# Patient Record
Sex: MALE | Race: WHITE | NOT HISPANIC OR LATINO | URBAN - METROPOLITAN AREA
[De-identification: names, ages, dates, MRNs, and addresses within clinical notes are randomized per-mention and may not be internally consistent; named-entity substitution may affect disease eponyms.]

---

## 2017-02-17 ENCOUNTER — DOCTOR'S OFFICE (OUTPATIENT)
Dept: URBAN - METROPOLITAN AREA CLINIC 136 | Facility: CLINIC | Age: 67
Setting detail: OPHTHALMOLOGY
End: 2017-02-17
Payer: COMMERCIAL

## 2017-02-17 DIAGNOSIS — H35.3134: ICD-10-CM

## 2017-02-17 DIAGNOSIS — H27.8: ICD-10-CM

## 2017-02-17 DIAGNOSIS — H18.50: ICD-10-CM

## 2017-02-17 DIAGNOSIS — H26.493: ICD-10-CM

## 2017-02-17 DIAGNOSIS — H43.813: ICD-10-CM

## 2017-02-17 PROCEDURE — 92134 CPTRZ OPH DX IMG PST SGM RTA: CPT | Performed by: OPHTHALMOLOGY

## 2017-02-17 PROCEDURE — 92014 COMPRE OPH EXAM EST PT 1/>: CPT | Performed by: OPHTHALMOLOGY

## 2017-02-17 ASSESSMENT — VISUAL ACUITY
OD_BCVA: 20/20-2
OS_BCVA: 20/25+1

## 2017-02-17 ASSESSMENT — REFRACTION_MANIFEST
OD_VA2: 20/
OD_VA3: 20/
OS_VA1: 20/30-2
OU_VA: 20/
OD_SPHERE: +0.25
OU_VA: 20/
OS_VA3: 20/
OD_VA1: 20/30
OS_VA1: 20/
OD_AXIS: 075
OS_ADD: +2.50
OD_VA2: 20/
OD_ADD: +2.50
OD_VA1: 20/
OS_VA2: 20/
OS_VA2: 20/
OD_CYLINDER: -1.00
OS_VA2: 20/30-2
OS_VA3: 20/
OD_VA2: 20/30
OS_SPHERE: +0.50
OS_VA1: 20/
OU_VA: 20/
OD_VA1: 20/
OD_VA3: 20/
OS_VA3: 20/
OS_AXIS: 105
OS_CYLINDER: -1.25
OD_VA3: 20/

## 2017-02-17 ASSESSMENT — SPHEQUIV_DERIVED
OD_SPHEQUIV: -0.25
OD_SPHEQUIV: 0.125
OS_SPHEQUIV: 0
OS_SPHEQUIV: -0.125

## 2017-02-17 ASSESSMENT — REFRACTION_CURRENTRX
OD_OVR_VA: 20/
OD_OVR_VA: 20/
OS_OVR_VA: 20/
OD_OVR_VA: 20/
OS_OVR_VA: 20/
OS_OVR_VA: 20/

## 2017-02-17 ASSESSMENT — CONFRONTATIONAL VISUAL FIELD TEST (CVF)
OS_FINDINGS: FULL
OD_FINDINGS: FULL

## 2017-02-17 ASSESSMENT — REFRACTION_AUTOREFRACTION
OD_CYLINDER: -1.25
OS_SPHERE: +0.75
OD_SPHERE: +0.75
OD_AXIS: 74
OS_AXIS: 106
OS_CYLINDER: -1.50

## 2017-02-17 ASSESSMENT — CORNEAL DYSTROPHY: OS_DYSTROPHY: S DRY EYES

## 2017-03-10 ENCOUNTER — DOCTOR'S OFFICE (OUTPATIENT)
Dept: URBAN - METROPOLITAN AREA CLINIC 136 | Facility: CLINIC | Age: 67
Setting detail: OPHTHALMOLOGY
End: 2017-03-10

## 2017-03-10 DIAGNOSIS — H52.223: ICD-10-CM

## 2017-03-10 DIAGNOSIS — H52.03: ICD-10-CM

## 2017-03-10 DIAGNOSIS — H52.4: ICD-10-CM

## 2017-03-10 PROCEDURE — 92015 DETERMINE REFRACTIVE STATE: CPT | Performed by: OPTOMETRIST

## 2017-03-10 ASSESSMENT — KERATOMETRY
OD_AXISANGLE_DEGREES: 178
OS_K1POWER_DIOPTERS: 42.50
OS_K2POWER_DIOPTERS: 44.00
OS_AXISANGLE_DEGREES: 017
OD_K2POWER_DIOPTERS: 44.00
OD_K1POWER_DIOPTERS: 43.00

## 2017-03-10 ASSESSMENT — SPHEQUIV_DERIVED
OD_SPHEQUIV: 0
OS_SPHEQUIV: 0.125

## 2017-03-10 ASSESSMENT — REFRACTION_CURRENTRX
OD_OVR_VA: 20/
OS_CYLINDER: -1.25
OS_VPRISM_DIRECTION: PROGS
OD_AXIS: 075
OS_OVR_VA: 20/
OS_AXIS: 105
OS_OVR_VA: 20/
OS_OVR_VA: 20/
OD_OVR_VA: 20/
OD_OVR_VA: 20/
OS_ADD: +2.50
OD_VPRISM_DIRECTION: PROGS
OD_SPHERE: +0.25
OS_SPHERE: +0.50
OD_CYLINDER: -1.00
OD_ADD: +2.50

## 2017-03-10 ASSESSMENT — REFRACTION_AUTOREFRACTION
OD_SPHERE: +0.50
OD_AXIS: 067
OS_SPHERE: +0.75
OS_CYLINDER: -1.25
OD_CYLINDER: -1.00
OS_AXIS: 097

## 2017-03-10 ASSESSMENT — REFRACTION_MANIFEST
OU_VA: 20/
OS_VA2: 20/
OD_VA1: 20/
OD_VA2: 20/
OD_VA1: 20/
OS_VA3: 20/
OS_VA2: 20/
OS_VA1: 20/
OD_VA3: 20/
OD_VA3: 20/
OS_VA3: 20/
OU_VA: 20/
OS_VA1: 20/
OD_VA2: 20/

## 2017-03-10 ASSESSMENT — REFRACTION_OUTSIDERX
OD_SPHERE: +0.50
OS_AXIS: 105
OD_ADD: +2.50
OS_SPHERE: +0.75
OS_VA2: 20/25
OD_AXIS: 075
OS_ADD: +2.50
OD_VA3: 20/
OS_VA1: 20/25
OD_VA2: 20/25
OS_CYLINDER: -1.25
OS_VA3: 20/
OU_VA: 20/20-2
OD_VA1: 20/25-1
OD_CYLINDER: -1.00

## 2017-03-10 ASSESSMENT — AXIALLENGTH_DERIVED
OS_AL: 23.6351
OD_AL: 23.5919

## 2017-03-10 ASSESSMENT — VISUAL ACUITY
OS_BCVA: 20/25-2
OD_BCVA: 20/25-2

## 2017-08-29 ENCOUNTER — DOCTOR'S OFFICE (OUTPATIENT)
Dept: URBAN - METROPOLITAN AREA CLINIC 136 | Facility: CLINIC | Age: 67
Setting detail: OPHTHALMOLOGY
End: 2017-08-29
Payer: COMMERCIAL

## 2017-08-29 DIAGNOSIS — H43.813: ICD-10-CM

## 2017-08-29 DIAGNOSIS — H26.493: ICD-10-CM

## 2017-08-29 DIAGNOSIS — H27.8: ICD-10-CM

## 2017-08-29 DIAGNOSIS — H35.3133: ICD-10-CM

## 2017-08-29 PROCEDURE — 92134 CPTRZ OPH DX IMG PST SGM RTA: CPT | Performed by: OPHTHALMOLOGY

## 2017-08-29 PROCEDURE — 92014 COMPRE OPH EXAM EST PT 1/>: CPT | Performed by: OPHTHALMOLOGY

## 2017-08-29 ASSESSMENT — REFRACTION_MANIFEST
OD_VA1: 20/
OD_VA2: 20/
OS_VA2: 20/
OS_VA3: 20/
OU_VA: 20/
OS_VA1: 20/
OD_VA2: 20/
OS_VA1: 20/
OD_VA3: 20/
OD_VA3: 20/
OS_VA3: 20/
OU_VA: 20/
OD_VA1: 20/
OS_VA2: 20/

## 2017-08-29 ASSESSMENT — REFRACTION_CURRENTRX
OS_ADD: +2.50
OD_OVR_VA: 20/
OS_OVR_VA: 20/
OS_OVR_VA: 20/
OS_VPRISM_DIRECTION: PROGS
OS_OVR_VA: 20/
OD_VPRISM_DIRECTION: PROGS
OD_AXIS: 075
OS_SPHERE: +0.50
OD_CYLINDER: -1.00
OD_OVR_VA: 20/
OS_AXIS: 105
OS_CYLINDER: -1.25
OD_SPHERE: +0.25
OD_ADD: +2.50
OD_OVR_VA: 20/

## 2017-08-29 ASSESSMENT — REFRACTION_AUTOREFRACTION
OS_SPHERE: +0.75
OD_AXIS: 067
OD_SPHERE: +0.50
OD_CYLINDER: -1.00
OS_AXIS: 097
OS_CYLINDER: -1.25

## 2017-08-29 ASSESSMENT — REFRACTION_OUTSIDERX
OS_SPHERE: +0.75
OD_ADD: +2.50
OS_VA3: 20/
OS_CYLINDER: -1.25
OD_VA2: 20/25
OS_VA1: 20/25
OD_AXIS: 075
OS_AXIS: 105
OS_VA2: 20/25
OD_VA3: 20/
OU_VA: 20/20-2
OD_CYLINDER: -1.00
OS_ADD: +2.50
OD_SPHERE: +0.50
OD_VA1: 20/25-1

## 2017-08-29 ASSESSMENT — CONFRONTATIONAL VISUAL FIELD TEST (CVF)
OD_FINDINGS: FULL
OS_FINDINGS: FULL

## 2017-08-29 ASSESSMENT — SPHEQUIV_DERIVED
OD_SPHEQUIV: 0
OS_SPHEQUIV: 0.125

## 2017-08-29 ASSESSMENT — CORNEAL DYSTROPHY: OS_DYSTROPHY: S DRY EYES

## 2017-08-29 ASSESSMENT — VISUAL ACUITY
OS_BCVA: 20/25-1
OD_BCVA: 20/25-1

## 2017-12-28 ENCOUNTER — OFFICE VISIT (OUTPATIENT)
Dept: URGENT CARE | Facility: CLINIC | Age: 67
End: 2017-12-28
Payer: MEDICARE

## 2017-12-28 PROCEDURE — G0463 HOSPITAL OUTPT CLINIC VISIT: HCPCS

## 2017-12-28 PROCEDURE — 99203 OFFICE O/P NEW LOW 30 MIN: CPT

## 2018-01-01 NOTE — PROGRESS NOTES
Assessment   1  Acute sinusitis (461 9) (J01 90)    Plan   Acute sinusitis    · Doxycycline Hyclate 100 MG Oral Capsule; TAKE 1 CAPSULE EVERY 12 HOURS    DAILY    Discussion/Summary   Discussion Summary:    Take antibiotic as prescribed nasal saline rinses meds as needed for symptom relief up with family doctor if symptoms worsen or persist       Chief Complaint   1  Cough  Chief Complaint Free Text Note Form: Patient relates started with sinus symptoms and sore throat x1 week  Felt hot  Has been taking Tylenol at home  History of Present Illness   HPI: A 64-year-old male presents with nasal congestion, sore throat, productive cough x1 week and fever x1 day  T-max 102Â°  Patient denies nausea vomiting diarrhea shortness of breath wheezing ear pain  Hospital Based Practices Required Assessment:      Abuse And Domestic Violence Screen       Yes, the patient is safe at home  -- The patient states no one is hurting them  Depression And Suicide Screen  No, the patient has not had thoughts of hurting themself  No, the patient has not felt depressed in the past 7 days  Prefered Language is  english  Primary Language is  english  Review of Systems   Focused-Male:      Constitutional: fever,-- feeling poorly-- and-- feeling tired, but-- no fever or chills, feels well, no tiredness, no recent weight loss or weight gain-- and-- no chills  ENT: earache,-- sore throat-- and-- nasal discharge, but-- no complaints of earache, no loss of hearing, no nosebleeds or nasal discharge, no sore throat or hoarseness,-- no nosebleeds-- and-- no hearing loss  Respiratory: cough, but-- no complaints of shortness of breath, no wheezing or cough, no dyspnea on exertion, no orthopnea or PND,-- no shortness of breath,-- no orthopnea,-- no wheezing,-- no shortness of breath during exertion-- and-- no PND        Gastrointestinal: no complaints of abdominal pain, no constipation, no nausea or vomiting, no diarrhea or bloody stools  Genitourinary: no complaints of dysuria or incontinence, no hesitancy, no nocturia, no genital lesion, no inadequacy of penile erection  Musculoskeletal: no complaints of arthralgia, no myalgia, no joint swelling or stiffness, no limb pain or swelling  Integumentary: no complaints of skin rash or lesion, no itching or dry skin, no skin wounds  Neurological: no complaints of headache, no confusion, no numbness or tingling, no dizziness or fainting  ROS Reviewed:    ROS reviewed  Active Problems   1  Acute sinusitis (461 9) (J01 90)    Past Medical History   1  History of Russ's esophagus (V12 79) (Z87 19)   2  History of hypertension (V12 59) (Z86 79)   3  History of Venous stasis (459 81) (I87 8)  Active Problems And Past Medical History Reviewed: The active problems and past medical history were reviewed and updated today  Family History   Mother    1  Family history of cardiac disorder (V17 49) (Z82 49)  Family History Reviewed: The family history was reviewed and updated today  Social History    · Never a smoker  Social History Reviewed: The social history was reviewed and is unchanged  Surgical History   1  History of Hip Replacement Left   2  History of Hip Replacement Right  Surgical History Reviewed: The surgical history was reviewed and updated today  Current Meds    1  Diovan 40 MG Oral Tablet; Therapy: (Recorded:[id number]) to Recorded   2  Furosemide 20 MG Oral Tablet; Therapy: (Recorded:16Lll1831) to Recorded   3  NexIUM 40 MG Oral Capsule Delayed Release; Therapy: (Recorded:45Vbd3281) to Recorded  Medication List Reviewed: The medication list was reviewed and updated today  Allergies   1  Penicillins   2  erythromycin   3   Statins    Vitals   Signs   Recorded: [id number] 12:31PM   Temperature: 98 F, Tympanic  Heart Rate: 72  Respiration: 16  Systolic: 996, RUE, Sitting  Diastolic: 90, RUE, Sitting  Height: 5 ft 10 in  Weight: 217 lb   BMI Calculated: 31 14  BSA Calculated: 2 16  O2 Saturation: 97, RA  Pain Scale: 3    Physical Exam        Constitutional      General appearance: No acute distress, well appearing and well nourished  Ears, Nose, Mouth, and Throat      External inspection of ears and nose: Normal        Otoscopic examination: Tympanic membrance translucent with normal light reflex  Canals patent without erythema  Nasal mucosa, septum, and turbinates: Abnormal   normal nasal septum,-- no intranasal masses or polyps-- and-- normal nasal turbinates  There was clear rhinorrhea from both nares  The bilateral nasal mucosa was edematous  Oropharynx: Normal with no erythema, edema, exudate or lesions  Pulmonary      Respiratory effort: No increased work of breathing or signs of respiratory distress  Auscultation of lungs: Clear to auscultation  no rales or crackles were heard bilaterally  no rhonchi  no friction rub  no wheezing  no diminished breath sounds  Cardiovascular      Palpation of heart: Normal PMI, no thrills  Auscultation of heart: Normal rate and rhythm, normal S1 and S2, without murmurs  Abdomen      Abdomen: Non-tender, no masses  Lymphatic      Palpation of lymph nodes in neck: Abnormal   bilateral anterior cervical node enlargement, but-- no posterior cervical node enlargement  Skin      Skin and subcutaneous tissue: Normal without rashes or lesions         Psychiatric      Orientation to person, place and time: Normal        Mood and affect: Normal        Signatures    Electronically signed by : LAYA Mancini; Dec 28 2017  4:12PM EST                       (Author)     Electronically signed by : TEODORO Brown ; Dec 31 2017  2:40PM EST                       (Co-author)

## 2018-01-23 VITALS
HEIGHT: 70 IN | BODY MASS INDEX: 31.07 KG/M2 | DIASTOLIC BLOOD PRESSURE: 90 MMHG | TEMPERATURE: 98 F | OXYGEN SATURATION: 97 % | RESPIRATION RATE: 16 BRPM | WEIGHT: 217 LBS | HEART RATE: 72 BPM | SYSTOLIC BLOOD PRESSURE: 144 MMHG

## 2018-03-14 ENCOUNTER — DOCTOR'S OFFICE (OUTPATIENT)
Dept: URBAN - METROPOLITAN AREA CLINIC 136 | Facility: CLINIC | Age: 68
Setting detail: OPHTHALMOLOGY
End: 2018-03-14

## 2018-03-14 ENCOUNTER — RX ONLY (RX ONLY)
Age: 68
End: 2018-03-14

## 2018-03-14 DIAGNOSIS — H52.4: ICD-10-CM

## 2018-03-14 DIAGNOSIS — H52.03: ICD-10-CM

## 2018-03-14 DIAGNOSIS — H52.223: ICD-10-CM

## 2018-03-14 PROCEDURE — 92015 DETERMINE REFRACTIVE STATE: CPT | Performed by: OPTOMETRIST

## 2018-03-14 ASSESSMENT — REFRACTION_OUTSIDERX
OD_CYLINDER: -1.25
OD_AXIS: 080
OS_CYLINDER: -1.50
OD_ADD: +2.50
OD_VA2: 20/25
OS_AXIS: 105
OS_VA3: 20/
OD_VA1: 20/25-1
OS_VA2: 20/25
OS_ADD: +2.50
OS_SPHERE: +1.00
OS_VA1: 20/25+2
OD_SPHERE: +0.75
OU_VA: 20/20-2
OD_VA3: 20/

## 2018-03-14 ASSESSMENT — REFRACTION_MANIFEST
OD_VA2: 20/
OD_VA1: 20/
OS_VA1: 20/
OS_VA2: 20/
OD_VA3: 20/
OS_VA2: 20/
OD_VA1: 20/
OU_VA: 20/
OS_VA3: 20/
OD_VA3: 20/
OU_VA: 20/
OS_VA3: 20/
OS_VA1: 20/
OD_VA2: 20/

## 2018-03-14 ASSESSMENT — REFRACTION_CURRENTRX
OS_SPHERE: +0.50
OD_ADD: +2.50
OS_OVR_VA: 20/
OS_VPRISM_DIRECTION: PROGS
OD_AXIS: 075
OS_OVR_VA: 20/
OD_CYLINDER: -1.00
OD_OVR_VA: 20/
OS_CYLINDER: -1.25
OD_OVR_VA: 20/
OS_ADD: +2.50
OS_AXIS: 105
OD_OVR_VA: 20/
OS_OVR_VA: 20/
OD_SPHERE: +0.25
OD_VPRISM_DIRECTION: PROGS

## 2018-03-14 ASSESSMENT — SPHEQUIV_DERIVED
OD_SPHEQUIV: -0.25
OS_SPHEQUIV: 0.125

## 2018-03-14 ASSESSMENT — REFRACTION_AUTOREFRACTION
OD_AXIS: 085
OD_SPHERE: +0.25
OD_CYLINDER: -1.00
OS_CYLINDER: -1.75
OS_SPHERE: +1.00
OS_AXIS: 088

## 2018-03-14 ASSESSMENT — CONFRONTATIONAL VISUAL FIELD TEST (CVF)
OD_FINDINGS: FULL
OS_FINDINGS: FULL

## 2018-03-14 ASSESSMENT — AXIALLENGTH_DERIVED
OD_AL: 23.6897
OS_AL: 23.6351

## 2018-03-14 ASSESSMENT — VISUAL ACUITY
OS_BCVA: 20/25-1
OD_BCVA: 20/25-1

## 2018-03-14 ASSESSMENT — KERATOMETRY
OD_AXISANGLE_DEGREES: 178
OD_K1POWER_DIOPTERS: 43.00
OD_K2POWER_DIOPTERS: 44.00
OS_K2POWER_DIOPTERS: 44.00
OS_AXISANGLE_DEGREES: 017
OS_K1POWER_DIOPTERS: 42.50

## 2018-04-11 ENCOUNTER — OPTICAL OFFICE (OUTPATIENT)
Dept: URBAN - METROPOLITAN AREA CLINIC 143 | Facility: CLINIC | Age: 68
Setting detail: OPHTHALMOLOGY
End: 2018-04-11

## 2018-04-11 ENCOUNTER — DOCTOR'S OFFICE (OUTPATIENT)
Dept: URBAN - METROPOLITAN AREA CLINIC 136 | Facility: CLINIC | Age: 68
Setting detail: OPHTHALMOLOGY
End: 2018-04-11
Payer: COMMERCIAL

## 2018-04-11 DIAGNOSIS — H52.223: ICD-10-CM

## 2018-04-11 DIAGNOSIS — H43.813: ICD-10-CM

## 2018-04-11 DIAGNOSIS — H35.3133: ICD-10-CM

## 2018-04-11 DIAGNOSIS — H27.8: ICD-10-CM

## 2018-04-11 PROCEDURE — 92134 CPTRZ OPH DX IMG PST SGM RTA: CPT | Performed by: OPHTHALMOLOGY

## 2018-04-11 PROCEDURE — V2750 ANTI-REFLECTIVE COATING: HCPCS | Performed by: OPTOMETRIST

## 2018-04-11 PROCEDURE — 92014 COMPRE OPH EXAM EST PT 1/>: CPT | Performed by: OPHTHALMOLOGY

## 2018-04-11 PROCEDURE — V2784 LENS POLYCARB OR EQUAL: HCPCS | Performed by: OPTOMETRIST

## 2018-04-11 PROCEDURE — V2781 PROGRESSIVE LENS PER LENS: HCPCS | Performed by: OPTOMETRIST

## 2018-04-11 PROCEDURE — V2020 VISION SVCS FRAMES PURCHASES: HCPCS | Performed by: OPTOMETRIST

## 2018-04-11 ASSESSMENT — REFRACTION_MANIFEST
OD_VA2: 20/
OD_VA2: 20/
OS_VA3: 20/
OS_VA1: 20/
OD_VA1: 20/
OS_VA2: 20/
OS_VA3: 20/
OU_VA: 20/
OS_VA1: 20/
OS_VA2: 20/
OU_VA: 20/
OD_VA1: 20/
OD_VA3: 20/
OD_VA3: 20/

## 2018-04-11 ASSESSMENT — REFRACTION_OUTSIDERX
OD_CYLINDER: -1.25
OD_VA1: 20/25-1
OS_VA2: 20/25
OS_VA1: 20/25+2
OD_VA3: 20/
OS_SPHERE: +1.00
OS_ADD: +2.50
OU_VA: 20/20-2
OS_VA3: 20/
OS_CYLINDER: -1.50
OD_AXIS: 080
OS_AXIS: 105
OD_ADD: +2.50
OD_VA2: 20/25
OD_SPHERE: +0.75

## 2018-04-11 ASSESSMENT — REFRACTION_AUTOREFRACTION
OS_CYLINDER: -1.75
OS_SPHERE: +1.00
OD_AXIS: 085
OD_CYLINDER: -1.00
OS_AXIS: 088
OD_SPHERE: +0.25

## 2018-04-11 ASSESSMENT — REFRACTION_CURRENTRX
OS_OVR_VA: 20/
OS_SPHERE: +0.50
OD_OVR_VA: 20/
OD_VPRISM_DIRECTION: PROGS
OS_CYLINDER: -1.25
OD_AXIS: 075
OS_AXIS: 105
OD_CYLINDER: -1.00
OS_ADD: +2.50
OD_ADD: +2.50
OS_OVR_VA: 20/
OS_VPRISM_DIRECTION: PROGS
OD_SPHERE: +0.25
OS_OVR_VA: 20/
OD_OVR_VA: 20/
OD_OVR_VA: 20/

## 2018-04-11 ASSESSMENT — CONFRONTATIONAL VISUAL FIELD TEST (CVF)
OS_FINDINGS: FULL
OD_FINDINGS: FULL

## 2018-04-11 ASSESSMENT — SPHEQUIV_DERIVED
OD_SPHEQUIV: -0.25
OS_SPHEQUIV: 0.125

## 2018-04-11 ASSESSMENT — VISUAL ACUITY
OS_BCVA: 20/25+2
OD_BCVA: 20/25+2

## 2018-04-11 ASSESSMENT — CORNEAL DYSTROPHY: OS_DYSTROPHY: S DRY EYES

## 2018-04-24 ENCOUNTER — DOCTOR'S OFFICE (OUTPATIENT)
Dept: URBAN - METROPOLITAN AREA CLINIC 136 | Facility: CLINIC | Age: 68
Setting detail: OPHTHALMOLOGY
End: 2018-04-24

## 2018-04-24 DIAGNOSIS — H52.7: ICD-10-CM

## 2018-04-24 PROCEDURE — CATARACT K CATARACT KIT: Performed by: OPHTHALMOLOGY

## 2018-07-24 ENCOUNTER — OFFICE VISIT (OUTPATIENT)
Dept: URGENT CARE | Facility: CLINIC | Age: 68
End: 2018-07-24
Payer: MEDICARE

## 2018-07-24 VITALS
BODY MASS INDEX: 30.78 KG/M2 | DIASTOLIC BLOOD PRESSURE: 90 MMHG | WEIGHT: 215 LBS | OXYGEN SATURATION: 99 % | SYSTOLIC BLOOD PRESSURE: 156 MMHG | TEMPERATURE: 98.2 F | RESPIRATION RATE: 16 BRPM | HEART RATE: 73 BPM | HEIGHT: 70 IN

## 2018-07-24 DIAGNOSIS — T63.441A BEE STING, ACCIDENTAL OR UNINTENTIONAL, INITIAL ENCOUNTER: Primary | ICD-10-CM

## 2018-07-24 PROCEDURE — 99213 OFFICE O/P EST LOW 20 MIN: CPT | Performed by: PHYSICIAN ASSISTANT

## 2018-07-24 PROCEDURE — G0463 HOSPITAL OUTPT CLINIC VISIT: HCPCS | Performed by: PHYSICIAN ASSISTANT

## 2018-07-24 RX ORDER — ACETAMINOPHEN 500 MG
500 TABLET ORAL EVERY 6 HOURS
COMMUNITY

## 2018-07-24 RX ORDER — SENNA AND DOCUSATE SODIUM 50; 8.6 MG/1; MG/1
2 TABLET, FILM COATED ORAL 2 TIMES DAILY
COMMUNITY
Start: 2017-01-09

## 2018-07-24 RX ORDER — FUROSEMIDE 20 MG/1
20 TABLET ORAL
COMMUNITY

## 2018-07-24 RX ORDER — VALSARTAN 40 MG/1
TABLET ORAL
COMMUNITY

## 2018-07-24 RX ORDER — ALPRAZOLAM 0.5 MG/1
0.5 TABLET ORAL 2 TIMES DAILY
COMMUNITY

## 2018-07-24 RX ORDER — POTASSIUM CHLORIDE 1.5 G/1.77G
20 POWDER, FOR SOLUTION ORAL
COMMUNITY

## 2018-07-24 RX ORDER — ESOMEPRAZOLE MAGNESIUM 40 MG/1
40 CAPSULE, DELAYED RELEASE ORAL
COMMUNITY

## 2018-07-24 NOTE — PATIENT INSTRUCTIONS
Apply ice  Elevate foot  Hydrocortisone cream twice a day  Claritin, allegra, or zyrtec for allergic reaction  Signs of infection: fever, increasing warm to touch, pus-like drainage, increasing swelling

## 2018-07-24 NOTE — PROGRESS NOTES
[facility]        NAME: Ayan Cohen is a 77 y o  male  : 1950    MRN: [id number]  DATE: 2018  TIME: 5:30 PM    Assessment and Plan   Bee sting, accidental or unintentional, initial encounter [T63 441A]  1  Bee sting, accidental or unintentional, initial encounter       Patient Instructions     Procedures  Patient Instructions   Apply ice  Elevate foot  Hydrocortisone cream twice a day  Claritin, allegra, or zyrtec for allergic reaction  Signs of infection: fever, increasing warm to touch, pus-like drainage, increasing swelling  Follow up with PCP in 3-5 days  Proceed to  ER if symptoms worsen  Chief Complaint     Chief Complaint   Patient presents with    Insect Bite     stung by bee while cutting grass on right ankle         History of Present Illness       Insect Bite   This is a new problem  The current episode started today  The problem occurs constantly  The problem has been unchanged  Pertinent negatives include no abdominal pain, anorexia, arthralgias, change in bowel habit, chest pain, chills, congestion, coughing, diaphoresis, fatigue, fever, headaches, joint swelling, myalgias, nausea, neck pain, numbness, rash, sore throat, swollen glands, urinary symptoms, vertigo, visual change, vomiting or weakness  Nothing aggravates the symptoms  He has tried ice for the symptoms  Review of Systems   Review of Systems   Constitutional: Negative for chills, diaphoresis, fatigue and fever  HENT: Negative for congestion and sore throat  Respiratory: Negative for cough  Cardiovascular: Negative for chest pain  Gastrointestinal: Negative for abdominal pain, anorexia, change in bowel habit, nausea and vomiting  Musculoskeletal: Negative for arthralgias, joint swelling, myalgias and neck pain  Skin: Negative for rash  Neurological: Negative for vertigo, weakness, numbness and headaches           Current Medications       Current Outpatient Prescriptions:    rivaroxaban (XARELTO) 10 mg tablet, Take 10 mg by mouth, Disp: , Rfl:     senna-docusate sodium (SENOKOT-S) 8 6-50 mg per tablet, Take 2 tablets by mouth 2 (two) times a day, Disp: , Rfl:     acetaminophen (TYLENOL) 500 mg tablet, Take 500 mg by mouth every 6 (six) hours, Disp: , Rfl:     ALPRAZolam (XANAX) 0 5 mg tablet, Take 0 5 mg by mouth 2 (two) times a day, Disp: , Rfl:     cyanocobalamin ([employer] VITAMIN B-12) 1000 MCG tablet, Take 1,000 mcg by mouth, Disp: , Rfl:     esomeprazole (NexIUM) 40 MG capsule, Take 40 mg by mouth, Disp: , Rfl:     furosemide (LASIX) 20 mg tablet, Take 20 mg by mouth, Disp: , Rfl:     potassium chloride (KLOR-CON) 20 mEq packet, Take 20 mEq by mouth, Disp: , Rfl:     valsartan (DIOVAN) 40 mg tablet, Take by mouth, Disp: , Rfl:     Current Allergies     Allergies as of 07/24/2018 - Reviewed 07/24/2018   Allergen Reaction Noted    Erythromycin GI Intolerance 07/24/2018    Penicillins Hives 07/24/2018    Statins Myalgia 07/24/2018            The following portions of the patient's history were reviewed and updated as appropriate: allergies, current medications, past family history, past medical history, past social history, past surgical history and problem list      Past Medical History:   Diagnosis Date    Anxiety     Clotting disorder ([facility] [address] )        Past Surgical History:   Procedure Laterality Date    JOINT REPLACEMENT         No family history on file  Medications have been verified  Objective   /90   Pulse 73   Temp 98 2 °F (36 8 °C) (Tympanic)   Resp 16   Ht 5' 10" (1 778 m)   Wt 97 5 kg (215 lb)   SpO2 99%   BMI 30 85 kg/m²        Physical Exam     Physical Exam   Constitutional: He appears well-developed and well-nourished  Cardiovascular: Normal rate, regular rhythm, normal heart sounds and intact distal pulses  Exam reveals no gallop and no friction rub  No murmur heard    Pulmonary/Chest: Effort normal and breath sounds normal  No respiratory distress  He has no wheezes  He has no rales  Abdominal: Soft  Bowel sounds are normal  He exhibits no distension  There is no tenderness  There is no rebound and no guarding     Skin:

## 2018-10-11 ENCOUNTER — DOCTOR'S OFFICE (OUTPATIENT)
Dept: URBAN - METROPOLITAN AREA CLINIC 136 | Facility: CLINIC | Age: 68
Setting detail: OPHTHALMOLOGY
End: 2018-10-11
Payer: COMMERCIAL

## 2018-10-11 DIAGNOSIS — H43.813: ICD-10-CM

## 2018-10-11 DIAGNOSIS — H26.493: ICD-10-CM

## 2018-10-11 DIAGNOSIS — H35.3133: ICD-10-CM

## 2018-10-11 DIAGNOSIS — H27.8: ICD-10-CM

## 2018-10-11 PROCEDURE — 92134 CPTRZ OPH DX IMG PST SGM RTA: CPT | Performed by: OPHTHALMOLOGY

## 2018-10-11 PROCEDURE — 92014 COMPRE OPH EXAM EST PT 1/>: CPT | Performed by: OPHTHALMOLOGY

## 2018-10-11 PROCEDURE — CATARACT K CATARACT KIT: Performed by: OPHTHALMOLOGY

## 2018-10-11 ASSESSMENT — SPHEQUIV_DERIVED
OS_SPHEQUIV: 0.125
OD_SPHEQUIV: -0.25
OS_SPHEQUIV: 0.25
OD_SPHEQUIV: 0.125

## 2018-10-11 ASSESSMENT — VISUAL ACUITY
OS_BCVA: 20/25-2
OD_BCVA: 20/25-1

## 2018-10-11 ASSESSMENT — REFRACTION_CURRENTRX
OD_AXIS: 075
OD_ADD: +2.50
OD_OVR_VA: 20/
OS_OVR_VA: 20/
OS_SPHERE: +0.50
OS_AXIS: 105
OS_OVR_VA: 20/
OS_VPRISM_DIRECTION: PROGS
OS_CYLINDER: -1.25
OD_OVR_VA: 20/
OS_ADD: +2.50
OS_OVR_VA: 20/
OD_VPRISM_DIRECTION: PROGS
OD_SPHERE: +0.25
OD_CYLINDER: -1.00
OD_OVR_VA: 20/

## 2018-10-11 ASSESSMENT — REFRACTION_MANIFEST
OD_VA3: 20/
OS_VA3: 20/
OS_VA2: 20/25
OS_VA1: 20/
OS_VA3: 20/
OS_SPHERE: +1.00
OD_VA1: 20/
OD_SPHERE: +0.75
OD_ADD: +2.50
OS_ADD: +2.50
OD_AXIS: 080
OD_VA2: 20/25
OD_VA2: 20/
OS_AXIS: 105
OD_CYLINDER: -1.25
OU_VA: 20/20-2
OU_VA: 20/
OS_VA2: 20/
OD_VA3: 20/
OS_CYLINDER: -1.50
OS_VA1: 20/25+2
OD_VA1: 20/25-1

## 2018-10-11 ASSESSMENT — REFRACTION_AUTOREFRACTION
OS_AXIS: 088
OD_AXIS: 085
OD_SPHERE: +0.25
OS_SPHERE: +1.00
OS_CYLINDER: -1.75
OD_CYLINDER: -1.00

## 2018-10-11 ASSESSMENT — CORNEAL DYSTROPHY: OS_DYSTROPHY: S DRY EYES

## 2018-10-11 ASSESSMENT — CONFRONTATIONAL VISUAL FIELD TEST (CVF)
OD_FINDINGS: FULL
OS_FINDINGS: FULL

## 2019-04-15 ENCOUNTER — DOCTOR'S OFFICE (OUTPATIENT)
Dept: URBAN - METROPOLITAN AREA CLINIC 136 | Facility: CLINIC | Age: 69
Setting detail: OPHTHALMOLOGY
End: 2019-04-15
Payer: COMMERCIAL

## 2019-04-15 DIAGNOSIS — H35.3133: ICD-10-CM

## 2019-04-15 DIAGNOSIS — H27.8: ICD-10-CM

## 2019-04-15 DIAGNOSIS — H26.493: ICD-10-CM

## 2019-04-15 DIAGNOSIS — H43.813: ICD-10-CM

## 2019-04-15 PROCEDURE — 92134 CPTRZ OPH DX IMG PST SGM RTA: CPT | Performed by: OPHTHALMOLOGY

## 2019-04-15 PROCEDURE — 92014 COMPRE OPH EXAM EST PT 1/>: CPT | Performed by: OPHTHALMOLOGY

## 2019-04-15 ASSESSMENT — REFRACTION_AUTOREFRACTION
OD_CYLINDER: -1.00
OD_SPHERE: +0.25
OS_SPHERE: +1.00
OS_AXIS: 088
OS_CYLINDER: -1.75
OD_AXIS: 085

## 2019-04-15 ASSESSMENT — REFRACTION_MANIFEST
OS_ADD: +2.50
OD_ADD: +2.50
OS_VA1: 20/
OD_VA3: 20/
OD_CYLINDER: -1.25
OD_VA2: 20/
OS_VA3: 20/
OS_CYLINDER: -1.50
OU_VA: 20/
OD_VA1: 20/
OU_VA: 20/20-2
OS_VA1: 20/25+2
OD_SPHERE: +0.75
OS_SPHERE: +1.00
OD_VA3: 20/
OS_AXIS: 105
OD_VA2: 20/25
OD_AXIS: 080
OD_VA1: 20/25-1
OS_VA2: 20/
OS_VA2: 20/25
OS_VA3: 20/

## 2019-04-15 ASSESSMENT — REFRACTION_CURRENTRX
OS_ADD: +2.50
OS_OVR_VA: 20/
OS_AXIS: 105
OD_OVR_VA: 20/
OS_SPHERE: +0.50
OD_OVR_VA: 20/
OS_OVR_VA: 20/
OD_ADD: +2.50
OD_CYLINDER: -1.00
OD_SPHERE: +0.25
OD_VPRISM_DIRECTION: PROGS
OD_OVR_VA: 20/
OS_VPRISM_DIRECTION: PROGS
OS_CYLINDER: -1.25
OS_OVR_VA: 20/
OD_AXIS: 075

## 2019-04-15 ASSESSMENT — CONFRONTATIONAL VISUAL FIELD TEST (CVF)
OS_FINDINGS: FULL
OD_FINDINGS: FULL

## 2019-04-15 ASSESSMENT — SPHEQUIV_DERIVED
OS_SPHEQUIV: 0.25
OD_SPHEQUIV: -0.25
OD_SPHEQUIV: 0.125
OS_SPHEQUIV: 0.125

## 2019-04-15 ASSESSMENT — VISUAL ACUITY
OS_BCVA: 20/25-2
OD_BCVA: 20/25-2

## 2019-04-15 ASSESSMENT — CORNEAL DYSTROPHY: OS_DYSTROPHY: S DRY EYES

## 2019-05-03 ENCOUNTER — DOCTOR'S OFFICE (OUTPATIENT)
Dept: URBAN - METROPOLITAN AREA CLINIC 136 | Facility: CLINIC | Age: 69
Setting detail: OPHTHALMOLOGY
End: 2019-05-03
Payer: COMMERCIAL

## 2019-05-03 DIAGNOSIS — H52.4: ICD-10-CM

## 2019-05-03 DIAGNOSIS — H52.03: ICD-10-CM

## 2019-05-03 DIAGNOSIS — H52.223: ICD-10-CM

## 2019-05-03 PROCEDURE — 92015 DETERMINE REFRACTIVE STATE: CPT | Performed by: OPTOMETRIST

## 2019-05-03 ASSESSMENT — SPHEQUIV_DERIVED
OS_SPHEQUIV: 0.125
OD_SPHEQUIV: -0.125
OD_SPHEQUIV: 0.125
OS_SPHEQUIV: 0.125

## 2019-05-03 ASSESSMENT — AXIALLENGTH_DERIVED
OS_AL: 23.6351
OD_AL: 23.6407
OS_AL: 23.6351
OD_AL: 23.5433

## 2019-05-03 ASSESSMENT — REFRACTION_MANIFEST
OS_VA2: 20/
OD_VA3: 20/
OS_VA3: 20/
OS_AXIS: 100
OU_VA: 20/20-2
OD_CYLINDER: -1.25
OD_ADD: +2.50
OD_VA3: 20/
OD_VA2: 20/
OD_AXIS: 075
OD_SPHERE: +0.75
OS_ADD: +2.50
OD_VA2: 20/25
OS_VA1: 20/25+2
OS_VA1: 20/
OU_VA: 20/
OD_VA1: 20/25-1
OD_VA1: 20/
OS_VA3: 20/
OS_CYLINDER: -1.75
OS_SPHERE: +1.00
OS_VA2: 20/25

## 2019-05-03 ASSESSMENT — REFRACTION_CURRENTRX
OS_AXIS: 100
OS_VPRISM_DIRECTION: PROGS
OS_CYLINDER: -1.50
OD_SPHERE: +0.75
OS_ADD: +2.50
OS_OVR_VA: 20/
OS_OVR_VA: 20/
OD_CYLINDER: -1.25
OD_AXIS: 080
OD_OVR_VA: 20/
OD_OVR_VA: 20/
OD_ADD: +2.50
OD_VPRISM_DIRECTION: PROGS
OD_OVR_VA: 20/
OS_SPHERE: +1.00
OS_OVR_VA: 20/

## 2019-05-03 ASSESSMENT — KERATOMETRY
OD_K1POWER_DIOPTERS: 43.00
OS_AXISANGLE_DEGREES: 017
OD_AXISANGLE_DEGREES: 178
OS_K2POWER_DIOPTERS: 44.00
OD_K2POWER_DIOPTERS: 44.00
OS_K1POWER_DIOPTERS: 42.50

## 2019-05-03 ASSESSMENT — REFRACTION_AUTOREFRACTION
OD_CYLINDER: -0.75
OS_SPHERE: +1.00
OS_AXIS: 132
OS_CYLINDER: -1.75
OD_AXIS: 092
OD_SPHERE: +0.25

## 2019-05-03 ASSESSMENT — VISUAL ACUITY
OS_BCVA: 20/25+1
OD_BCVA: 20/20-1

## 2019-10-04 ENCOUNTER — RX ONLY (RX ONLY)
Age: 69
End: 2019-10-04

## 2019-10-04 ENCOUNTER — DOCTOR'S OFFICE (OUTPATIENT)
Dept: URBAN - METROPOLITAN AREA CLINIC 136 | Facility: CLINIC | Age: 69
Setting detail: OPHTHALMOLOGY
End: 2019-10-04
Payer: COMMERCIAL

## 2019-10-04 DIAGNOSIS — H43.813: ICD-10-CM

## 2019-10-04 DIAGNOSIS — H26.493: ICD-10-CM

## 2019-10-04 DIAGNOSIS — H27.8: ICD-10-CM

## 2019-10-04 DIAGNOSIS — H35.3133: ICD-10-CM

## 2019-10-04 PROCEDURE — 92014 COMPRE OPH EXAM EST PT 1/>: CPT | Performed by: OPHTHALMOLOGY

## 2019-10-04 PROCEDURE — 92134 CPTRZ OPH DX IMG PST SGM RTA: CPT | Performed by: OPHTHALMOLOGY

## 2019-10-04 ASSESSMENT — REFRACTION_MANIFEST
OU_VA: 20/20-2
OS_VA2: 20/
OD_VA3: 20/
OS_CYLINDER: -1.75
OS_ADD: +2.50
OS_SPHERE: +1.00
OS_VA3: 20/
OD_VA2: 20/25
OD_VA3: 20/
OS_VA3: 20/
OD_ADD: +2.50
OD_VA1: 20/25-1
OD_VA2: 20/
OD_VA1: 20/
OD_AXIS: 075
OS_VA2: 20/25
OD_SPHERE: +0.75
OS_VA1: 20/
OD_CYLINDER: -1.25
OS_AXIS: 100
OU_VA: 20/
OS_VA1: 20/25+2

## 2019-10-04 ASSESSMENT — REFRACTION_CURRENTRX
OD_OVR_VA: 20/
OS_CYLINDER: -1.50
OS_OVR_VA: 20/
OD_AXIS: 080
OS_ADD: +2.50
OS_AXIS: 100
OD_VPRISM_DIRECTION: PROGS
OD_SPHERE: +0.75
OS_OVR_VA: 20/
OD_OVR_VA: 20/
OD_ADD: +2.50
OS_SPHERE: +1.00
OS_VPRISM_DIRECTION: PROGS
OD_OVR_VA: 20/
OS_OVR_VA: 20/
OD_CYLINDER: -1.25

## 2019-10-04 ASSESSMENT — REFRACTION_AUTOREFRACTION
OD_SPHERE: +0.25
OD_CYLINDER: -0.75
OS_SPHERE: +1.00
OS_AXIS: 132
OS_CYLINDER: -1.75
OD_AXIS: 092

## 2019-10-04 ASSESSMENT — CORNEAL DYSTROPHY: OS_DYSTROPHY: S DRY EYES

## 2019-10-04 ASSESSMENT — SPHEQUIV_DERIVED
OD_SPHEQUIV: 0.125
OD_SPHEQUIV: -0.125
OS_SPHEQUIV: 0.125
OS_SPHEQUIV: 0.125

## 2019-10-04 ASSESSMENT — VISUAL ACUITY
OS_BCVA: 20/25+1
OD_BCVA: 20/20-2

## 2019-10-04 ASSESSMENT — CONFRONTATIONAL VISUAL FIELD TEST (CVF)
OD_FINDINGS: FULL
OS_FINDINGS: FULL

## 2019-10-05 PROBLEM — H52.4 HYPEROPIA, ASTIGMATISM, PRESBYOPIA OU: Status: ACTIVE | Noted: 2017-03-10

## 2019-10-05 PROBLEM — H27.8 PSEUDOPHAKIA OU ; BOTH EYES: Status: ACTIVE | Noted: 2017-03-10

## 2019-10-05 PROBLEM — H26.493: Status: ACTIVE | Noted: 2017-03-10

## 2019-10-05 PROBLEM — H52.03 HYPEROPIA, ASTIGMATISM, PRESBYOPIA OU: Status: ACTIVE | Noted: 2017-03-10

## 2019-10-05 PROBLEM — H52.223 HYPEROPIA, ASTIGMATISM, PRESBYOPIA OU: Status: ACTIVE | Noted: 2017-03-10

## 2019-10-05 PROBLEM — H43.813 POSTERIOR VITREOUS DETACHMENT; BOTH EYES: Status: ACTIVE | Noted: 2017-03-10

## 2019-11-02 ENCOUNTER — LAB REQUISITION (OUTPATIENT)
Dept: LAB | Facility: HOSPITAL | Age: 69
End: 2019-11-02
Payer: MEDICARE

## 2019-11-02 DIAGNOSIS — Z13.220 ENCOUNTER FOR SCREENING FOR LIPOID DISORDERS: ICD-10-CM

## 2019-11-02 LAB — CHOLEST SERPL-MCNC: 228 MG/DL (ref 50–200)

## 2021-04-08 DIAGNOSIS — Z23 ENCOUNTER FOR IMMUNIZATION: ICD-10-CM

## 2022-02-02 ENCOUNTER — DOCTOR'S OFFICE (OUTPATIENT)
Dept: URBAN - NONMETROPOLITAN AREA CLINIC 1 | Facility: CLINIC | Age: 72
Setting detail: OPHTHALMOLOGY
End: 2022-02-02
Payer: COMMERCIAL

## 2022-02-02 DIAGNOSIS — H25.13: ICD-10-CM

## 2022-02-02 PROCEDURE — CATARACT K CATARACT KIT: Performed by: OPHTHALMOLOGY

## 2023-03-03 ENCOUNTER — TELEPHONE (OUTPATIENT)
Dept: UROLOGY | Facility: AMBULATORY SURGERY CENTER | Age: 73
End: 2023-03-03

## 2023-03-03 NOTE — TELEPHONE ENCOUNTER
New Patient    What is the reason for the patient’s appointment? Enlarged prostate family hx of bladder cancer possible UTI     What office location does the patient prefer? [facility]     Imaging/Lab Results:    Do we accept the patient's insurance or is the patient Self-Pay? Yes     Insurance Provider: Medicare HOP   Plan Type/Number:  Member ID#: [id number]  [id number]    Has the patient had any previous Urologist(s)? Dr Leonor Courtney ( passed away)    Have patient records been requested? If not are records showing in [address] [place] [address] Rd: records in [facility]     Has the patient had any outside testing done? No     Does the patient have a personal history of cancer?   No     Pt call [phone]

## 2023-03-05 NOTE — PROGRESS NOTES
UROLOGY PROGRESS NOTE         NAME: La Woodson  AGE: 74 y o  SEX: male  : 1950   MRN: [id number]    DATE: 3/7/2023  TIME: 10:22 AM    Assessment and Plan   Procedures     Impression:   1  UTI symptoms  -     Urine culture; Future  -     Urine culture  -     ciprofloxacin (CIPRO) 500 mg tablet; Take 1 tablet (500 mg total) by mouth every 12 (twelve) hours for 7 days    2  Benign prostatic hyperplasia with lower urinary tract symptoms, symptom details unspecified  -     POCT urine dip auto non-scope  -     POCT Measure PVR    3  Elevated PSA    4  Microhematuria    5  Vasculogenic erectile dysfunction, unspecified vasculogenic erectile dysfunction type         Plan: For his acute cystitis    Put the patient on Cipro 500 twice a day for a week  I am going to see the patient back in 4 weeks for a cystoscopy and a cytology  Patient is very concerned about family history of bladder cancer and microscopic hematuria  Patient with a history of erectile dysfunction on sildenafil in the past but prefers not to be on any medications now for that nonbothersome  Chief Complaint     Chief Complaint   Patient presents with   • Possible UTI     Uti symptoms x 1 week  Burning, frequency, slight cloudiness  No fever reported     History of Present Illness     HPI: La Woodson is a 74y o  year old male who presents with follow-up enlarged prostate, family history of bladder cancer and a possible UTI  Currently patient not on any prostate medicines and a PSA that I found  was elevated at 4 89  Patient had a recent urinalysis 2023 that was normal   He did have a positive urinalysis for white cells in 2022  This was on review of old records  Patient currently a week history of dysuria burning with small frequent amounts  He usually has some frequency related to Lasix otherwise voids fine      I discussed with him the elevated PSA back in  and and  that was 4 8 he said his urologist in [place] said that the PSA end up coming back down and no biopsies were required  Patient has no previous  surgical history  The following portions of the patient's history were reviewed and updated as appropriate: allergies, current medications, past family history, past medical history, past social history, past surgical history and problem list   Past Medical History:   Diagnosis Date   • Anxiety    • Clotting disorder ([address] )    • Night sweats      Past Surgical History:   Procedure Laterality Date   • JOINT REPLACEMENT       shoulder  Review of Systems     Const: Denies chills, fever and weight loss  CV: Denies chest pain  Resp: Denies SOB  GI: Denies abdominal pain, nausea and vomiting  : Denies symptoms other than stated above  Musculo: Denies back pain  Objective   BP (!) 180/90 (BP Location: Left arm, Patient Position: Sitting)   Pulse 83   Temp 99 5 °F (37 5 °C)   Ht 5' 10" (1 778 m)   Wt 105 kg (230 lb 12 8 oz)   SpO2 98%   BMI 33 12 kg/m²     Physical Exam  Const: Appears healthy and well developed  No signs of acute distress present  Resp: Respirations are regular and unlabored  CV: Rate is regular  Rhythm is regular  Abdomen: Abdomen is soft, nontender, and nondistended  Kidneys are not palpable  : He had a normal external genitalia exam the prostate was slightly enlarged but smooth no masses or nodules  Psych: Patient's attitude is cooperative   Mood is normal  Affect is normal     Current Medications     Current Outpatient Medications:   •  acetaminophen (TYLENOL) 500 mg tablet, Take 500 mg by mouth every 6 (six) hours, Disp: , Rfl:   •  Bioflavonoid Products (EVANS C PO), Take by mouth, Disp: , Rfl:   •  Cholecalciferol 25 MCG (1000 UT) tablet, Take by mouth, Disp: , Rfl:   •  ciprofloxacin (CIPRO) 500 mg tablet, Take 1 tablet (500 mg total) by mouth every 12 (twelve) hours for 7 days, Disp: 14 tablet, Rfl: 0  •  cyanocobalamin (VITAMIN B-12) 1000 MCG tablet, Take 1,000 mcg by mouth, Disp: , Rfl:   •  esomeprazole (NexIUM) 40 MG capsule, Take 40 mg by mouth, Disp: , Rfl:   •  furosemide (LASIX) 20 mg tablet, Take 20 mg by mouth, Disp: , Rfl:   •  Klor-Con M20 20 MEQ tablet, Take 20 mEq by mouth daily, Disp: , Rfl:   •  losartan (COZAAR) 100 MG tablet, , Disp: , Rfl:   •  Multiple Vitamins-Minerals (PRESERVISION AREDS 2 PO), Take by mouth, Disp: , Rfl:   •  Nexletol 180 MG TABS, Take 1 tablet by mouth daily, Disp: , Rfl:   •  potassium chloride (KLOR-CON) 20 mEq packet, Take 20 mEq by mouth, Disp: , Rfl:   •  rivaroxaban (XARELTO) 10 mg tablet, Take 10 mg by mouth, Disp: , Rfl:   •  ALPRAZolam (XANAX) 0 5 mg tablet, Take 0 5 mg by mouth 2 (two) times a day (Patient not taking: Reported on 3/7/2023), Disp: , Rfl:   •  valsartan (DIOVAN) 40 mg tablet, Take by mouth (Patient not taking: Reported on 3/7/2023), Disp: , Rfl:         Esau Little MD

## 2023-03-06 RX ORDER — BEMPEDOIC ACID 180 MG/1
1 TABLET, FILM COATED ORAL DAILY
COMMUNITY
Start: 2023-02-19

## 2023-03-06 RX ORDER — POTASSIUM CHLORIDE 1500 MG/1
20 TABLET, EXTENDED RELEASE ORAL DAILY
COMMUNITY
Start: 2022-12-26

## 2023-03-06 RX ORDER — LOSARTAN POTASSIUM 100 MG/1
TABLET ORAL
COMMUNITY

## 2023-03-07 ENCOUNTER — OFFICE VISIT (OUTPATIENT)
Dept: UROLOGY | Facility: CLINIC | Age: 73
End: 2023-03-07

## 2023-03-07 VITALS
TEMPERATURE: 99.5 F | WEIGHT: 230.8 LBS | HEIGHT: 70 IN | DIASTOLIC BLOOD PRESSURE: 90 MMHG | HEART RATE: 83 BPM | BODY MASS INDEX: 33.04 KG/M2 | OXYGEN SATURATION: 98 % | SYSTOLIC BLOOD PRESSURE: 180 MMHG

## 2023-03-07 DIAGNOSIS — N52.9 VASCULOGENIC ERECTILE DYSFUNCTION, UNSPECIFIED VASCULOGENIC ERECTILE DYSFUNCTION TYPE: ICD-10-CM

## 2023-03-07 DIAGNOSIS — R39.9 UTI SYMPTOMS: Primary | ICD-10-CM

## 2023-03-07 DIAGNOSIS — R31.29 MICROHEMATURIA: ICD-10-CM

## 2023-03-07 DIAGNOSIS — R97.20 ELEVATED PSA: ICD-10-CM

## 2023-03-07 DIAGNOSIS — N40.1 BENIGN PROSTATIC HYPERPLASIA WITH LOWER URINARY TRACT SYMPTOMS, SYMPTOM DETAILS UNSPECIFIED: ICD-10-CM

## 2023-03-07 LAB
POST-VOID RESIDUAL VOLUME, ML POC: 211 ML
SL AMB  POCT GLUCOSE, UA: NORMAL
SL AMB LEUKOCYTE ESTERASE,UA: NORMAL
SL AMB POCT BILIRUBIN,UA: NORMAL
SL AMB POCT BLOOD,UA: NORMAL
SL AMB POCT CLARITY,UA: NORMAL
SL AMB POCT COLOR,UA: YELLOW
SL AMB POCT KETONES,UA: NORMAL
SL AMB POCT NITRITE,UA: NORMAL
SL AMB POCT PH,UA: 6.5
SL AMB POCT SPECIFIC GRAVITY,UA: 1.01
SL AMB POCT URINE PROTEIN: NORMAL
SL AMB POCT UROBILINOGEN: 0.2

## 2023-03-07 RX ORDER — CIPROFLOXACIN 500 MG/1
500 TABLET, FILM COATED ORAL EVERY 12 HOURS SCHEDULED
Qty: 14 TABLET | Refills: 0 | Status: SHIPPED | OUTPATIENT
Start: 2023-03-07 | End: 2023-03-14

## 2023-03-11 LAB
BACTERIA UR CULT: ABNORMAL
BACTERIA UR CULT: ABNORMAL

## 2023-04-01 PROBLEM — Z87.898 HISTORY OF ELEVATED PSA: Status: ACTIVE | Noted: 2023-04-01

## 2023-04-01 PROBLEM — R31.29 MICROHEMATURIA: Status: ACTIVE | Noted: 2023-04-01

## 2023-04-01 PROBLEM — N30.90 CYSTITIS: Status: ACTIVE | Noted: 2023-04-01

## 2023-04-01 PROBLEM — Z80.52 FAMILY HISTORY OF BLADDER CANCER: Status: ACTIVE | Noted: 2023-04-01

## 2023-04-01 NOTE — PROGRESS NOTES
UROLOGY PROGRESS NOTE         NAME: Nicole Simon  AGE: 74 y o  SEX: male  : 1950   MRN: [id number]    DATE: 2023  TIME: 7:48 PM    Assessment and Plan      Cystoscopy     Date/Time 2023 11:18 AM     Performed by  Arnie Demarco MD     Authorized by Arnie Demarco MD          Procedure Details: Additional Procedure Details: Patient was prepped and draped usual sterile fashion flexible cystoscopy was carried out normal anterior posterior urethra slightly enlarged prostate slightly hyperemic  He had a moderately trabeculated bladder otherwise normal no tumors stones or diverticuli near  Patient tolerated well       Impression:   1  Cystitis    2  Microhematuria    3  Family history of bladder cancer    4  History of elevated PSA         Plan: Plan is PSA in 10 weeks call patient with results of tests normal follow-up in 1 year  Chief Complaint   No chief complaint on file  History of Present Illness     HPI: Nicole Simon is a 74y o  year old male who presents with follow-up office visit 3/7/2023 history of acute cystitis and BPH  Also with a family history of bladder cancer  Patient was treated with Cipro twice a day for a week and because of the family history of bladder cancer and microscopic hematuria we are going to do a cystoscopy  He also has a history of  erectile dysfunction but currently not on sildenafil nonbothersome  His urine symptoms were burning frequency and slight odor for a week  His urine culture did grow out Staph aureus  Has had a history of elevated PSA in the past 2018 was 4 89  But the urologist told him that it was trending down over the years and did not require a prostate biopsy  We will set him up for a PSA in about 8 weeks after his cystoscopy                    The following portions of the patient's history were reviewed and updated as appropriate: allergies, current medications, past family history, past medical history, past social history, past surgical history and problem list   Past Medical History:   Diagnosis Date   • Anxiety    • Clotting disorder ([address] )    • Night sweats      Past Surgical History:   Procedure Laterality Date   • JOINT REPLACEMENT       shoulder  Review of Systems     Const: Denies chills, fever and weight loss  CV: Denies chest pain  Resp: Denies SOB  GI: Denies abdominal pain, nausea and vomiting  : Denies symptoms other than stated above  Musculo: Denies back pain  Objective   There were no vitals taken for this visit  Physical Exam  Const: Appears healthy and well developed  No signs of acute distress present  Resp: Respirations are regular and unlabored  CV: Rate is regular  Rhythm is regular  Abdomen: Abdomen is soft, nontender, and nondistended  Kidneys are not palpable  : nl  Psych: Patient's attitude is cooperative   Mood is normal  Affect is normal     Current Medications     Current Outpatient Medications:   •  acetaminophen (TYLENOL) 500 mg tablet, Take 500 mg by mouth every 6 (six) hours, Disp: , Rfl:   •  ALPRAZolam (XANAX) 0 5 mg tablet, Take 0 5 mg by mouth 2 (two) times a day (Patient not taking: Reported on 3/7/2023), Disp: , Rfl:   •  Bioflavonoid Products (EVANS C PO), Take by mouth, Disp: , Rfl:   •  Cholecalciferol 25 MCG (1000 UT) tablet, Take by mouth, Disp: , Rfl:   •  cyanocobalamin (VITAMIN B-12) 1000 MCG tablet, Take 1,000 mcg by mouth, Disp: , Rfl:   •  esomeprazole (NexIUM) 40 MG capsule, Take 40 mg by mouth, Disp: , Rfl:   •  furosemide (LASIX) 20 mg tablet, Take 20 mg by mouth, Disp: , Rfl:   •  Klor-Con M20 20 MEQ tablet, Take 20 mEq by mouth daily, Disp: , Rfl:   •  losartan (COZAAR) 100 MG tablet, , Disp: , Rfl:   •  Multiple Vitamins-Minerals (PRESERVISION AREDS 2 PO), Take by mouth, Disp: , Rfl:   •  Nexletol 180 MG TABS, Take 1 tablet by mouth daily, Disp: , Rfl:   •  potassium chloride (KLOR-CON) 20 mEq packet, Take 20 mEq by mouth, Disp: , Rfl:   •  rivaroxaban (XARELTO) 10 mg tablet, Take 10 mg by mouth, Disp: , Rfl:   •  valsartan (DIOVAN) 40 mg tablet, Take by mouth (Patient not taking: Reported on 3/7/2023), Disp: , Rfl:         Tamara Rubalcava MD

## 2023-04-05 ENCOUNTER — PROCEDURE VISIT (OUTPATIENT)
Dept: UROLOGY | Facility: CLINIC | Age: 73
End: 2023-04-05

## 2023-04-05 VITALS
DIASTOLIC BLOOD PRESSURE: 78 MMHG | HEART RATE: 88 BPM | HEIGHT: 70 IN | WEIGHT: 230 LBS | SYSTOLIC BLOOD PRESSURE: 162 MMHG | TEMPERATURE: 98.3 F | OXYGEN SATURATION: 96 % | BODY MASS INDEX: 32.93 KG/M2

## 2023-04-05 DIAGNOSIS — R35.1 NOCTURIA: ICD-10-CM

## 2023-04-05 DIAGNOSIS — Z87.898 HISTORY OF ELEVATED PSA: ICD-10-CM

## 2023-04-05 DIAGNOSIS — Z80.52 FAMILY HISTORY OF BLADDER CANCER: ICD-10-CM

## 2023-04-05 DIAGNOSIS — R31.29 MICROHEMATURIA: ICD-10-CM

## 2023-04-05 DIAGNOSIS — N30.90 CYSTITIS: Primary | ICD-10-CM

## 2023-04-06 LAB — BACTERIA UR CULT: NORMAL

## 2023-06-05 ENCOUNTER — HOSPITAL ENCOUNTER (OUTPATIENT)
Dept: RADIOLOGY | Facility: HOSPITAL | Age: 73
Discharge: HOME | End: 2023-06-05
Payer: MEDICARE

## 2023-06-05 DIAGNOSIS — M25.512 LEFT SHOULDER PAIN, UNSPECIFIED CHRONICITY: ICD-10-CM

## 2023-06-05 PROCEDURE — 73030 X-RAY EXAM OF SHOULDER: CPT

## 2023-09-11 ENCOUNTER — HOSPITAL ENCOUNTER (OUTPATIENT)
Dept: RADIOLOGY | Facility: HOSPITAL | Age: 73
Discharge: HOME | End: 2023-09-11
Payer: MEDICARE

## 2023-09-11 DIAGNOSIS — G56.93 UNSPECIFIED MONONEUROPATHY OF BILATERAL UPPER LIMBS: ICD-10-CM

## 2023-09-11 PROCEDURE — 72050 X-RAY EXAM NECK SPINE 4/5VWS: CPT

## 2023-10-10 ENCOUNTER — EVALUATION (OUTPATIENT)
Dept: PHYSICAL THERAPY | Facility: CLINIC | Age: 73
End: 2023-10-10
Payer: MEDICARE

## 2023-10-10 DIAGNOSIS — G56.03 CARPAL TUNNEL SYNDROME, BILATERAL: ICD-10-CM

## 2023-10-10 DIAGNOSIS — M54.2 CERVICALGIA: Primary | ICD-10-CM

## 2023-10-10 PROCEDURE — 97161 PT EVAL LOW COMPLEX 20 MIN: CPT

## 2023-10-10 PROCEDURE — 97140 MANUAL THERAPY 1/> REGIONS: CPT

## 2023-10-10 PROCEDURE — 97535 SELF CARE MNGMENT TRAINING: CPT

## 2023-10-10 PROCEDURE — 97112 NEUROMUSCULAR REEDUCATION: CPT

## 2023-10-10 NOTE — LETTER
October 10, 2023    Luna Lyons MD   [address]  [address] [facility]    Patient: Bc hCaudhary   YOB: 1950   Date of Visit: 10/10/2023     Encounter Diagnosis     ICD-10-CM    1. Cervicalgia  M54.2       2. Carpal tunnel syndrome, bilateral  G56.03           Dear Dr. Joaquin Valenzuela: Thank you for your recent referral of Bc Chaudhary. Please review the attached evaluation summary from Edgard's recent visit. Please verify that you agree with the plan of care by signing the attached order. If you have any questions or concerns, please do not hesitate to call. I sincerely appreciate the opportunity to share in the care of one of your patients and hope to have another opportunity to work with you in the near future. Sincerely,    Mikayla Kendall, PT      Referring Provider:      I certify that I have read the below Plan of Care and certify the need for these services furnished under this plan of treatment while under my care. Luna Lyons MD   [address]  [address]  [facility].  Via Fax: [phone]          PT Evaluation     Today's date: 10/10/2023  Patient name: Bc Chaudhary  : 1950  MRN: [id number]  Referring provider: Luna Lyons MD  Dx:   Encounter Diagnosis     ICD-10-CM    1. Cervicalgia  M54.2       2. Carpal tunnel syndrome, bilateral  G56.03           Start Time: 1105  Stop Time: 1220  Total time in clinic (min): 75 minutes    Assessment  Assessment details: Patient is a 74 y.o. male with medical diagnosis of bilateral carpal tunnel syndrome and cervicalgia. Following a thorough physical therapy evaluation, the patient demonstrates objective impairments in bilateral  strength, cervical ROM, deep cervical flexor strength, and posterior chain stability. Functionally, symptoms in distal extremities are impacting his ability to open containers at home, play piano/organ s/ pain and bike s/ pain in BUE.  Provided considerable education regarding cervical anatomy and muscle imbalances. Provided patient c/ handout c/ HEP. Patient demonstrated each exercise c/ good form and verbalized understanding of expectations c/ HEP. Patient will benefit from skilled physical therapy treatment to address the aforementioned impairments and functional deficits, and accomplish patient goals. .   Impairments: abnormal or restricted ROM, abnormal movement, activity intolerance, impaired physical strength, lacks appropriate home exercise program, pain with function, scapular dyskinesis and poor posture   Functional limitations: opening containers, play piano/organ, bike  Goals  STG (3 weeks): Increase cervical extension by 5 degrees. Able to perform scap TB row c/ proper form and use of scapular stabilizers. Able to perform chin tucks in seated s/ assistance. LTG (6 weeks):  Cervical ROM WNL. 75% improvement in b/l wrist and hand pain and N/T. Patient will be independent with HEP in 6 weeks to allow independent management of condition. Plan  Plan details: TE, NMR, TA, MT, self-care, and modalities as needed in order to progress through skilled PT focused on ROM, strength, balance, motor control. Patient would benefit from: skilled physical therapy  Planned modality interventions: cryotherapy and thermotherapy: hydrocollator packs  Planned therapy interventions: manual therapy, neuromuscular re-education, patient education, self care, therapeutic activities, therapeutic exercise and home exercise program  Frequency: 2x week  Duration in weeks: 6  Plan of Care beginning date: 10/10/2023  Plan of Care expiration date: 11/21/2023  Treatment plan discussed with: patient        Subjective Evaluation    History of Present Illness  Mechanism of injury: IE: Patient is a 74 y.o. male presenting with bilateral hand N/T.  Current symptoms started in left shoulder, progressed to right shoulder, then progressed to b/l hands and has intensified since specifically in August and 2023. Symptoms worsen c/ sedentary behavior. Advil and tylenol improve symptoms slightly. Has had x-rays of both left & right shoulder and neck. (-) for fracture. Wears wrist braces b/l at night which helps c/ pain moderately, improves swelling as well. Has been completing several hand squeezing exercises c/ ball. Ices wrist regularly, c/ moderate temporary effect on pain. Bikes approx 50 miles per week. Plays piano and organs. Gets a weekly 90 min full body massage. Patients primary goals for PT are to be able to piano and organ and have no symptoms in b/l hands. Patient Goals  Patient goals for therapy: increased strength, decreased pain, increased motion and return to sport/leisure activities    Pain  Current pain rating: 3  At best pain ratin  At worst pain rating: 10      Diagnostic Tests  X-ray: abnormal (disc height loss and endplate and facet joint degenerative change at C5-6 and C6-7.)  Treatments  Previous treatment: medication        Objective    Observation:     -Posture: Forward head, moderate thoracic kyphosis    Neuro Screen:  Edwards's: (-)  UE Clonus: (-)  DTRs:      -C5 (Biceps): 1+/2+     -C6 (Brachioradialis): 1+/1+     -C7 (Triceps): 2+/2+    Cervical ROM (degrees):    IE  FLX:  50  EXT:  35  SB (R/L): 30/20  ROT (R/L):  40/40    Cervical Special Tests:  Spurlings (R/L): (-) for any change in either direction  Distraction: No change in symptoms.  Strength (Trial 1,2,3):   IE  Right:  25/25/15  Left: 15/20/20    Deep Cervical Flexor Endurance: Unable to maintain for >5". Thoracic Mobility: Significant limitation in rotation b/l and extension. Scapular Stability: Difficulty achieving scap retraction s/ tactile cues. Shoulder ROM: Pinches at end range flexion, abduction and IR.          Flowsheet Rows    Flowsheet Row Most Recent Value   PT/OT G-Codes    Current Score 60   Projected Score 69            Precautions: None    Daily Treatment Diary:      Initial Evaluation Date: 10/10/23  POC Expiration: 11/21/23  Compliance 10/10/23                     Visit Number 1                    Re-Eval  IE                 MC   Foto Captured Y                        Date 10/10       Manuals        Manual Traction KS       SOR        Closing Mob                                Neuro Re-Ed        [employer] nv       Deep Cervical Flexion        Scap Retract        Prone Scap Stab        [employer]: Row, Ext, HABD nv       SA Punch        Bilateral ER GTB 5"/20       Bilateral ABD nv       Ball FLX/Scap/ABD        TB Rows & EXT GTB x10                                       Ther Ex        Cervical Isometrics        Corner/Door Stretch        Seated TS Extension nv       Seated TS Rotation c/ C/S Fixed nv       Standing FLX/Scaption        Open Book        Foam Roller T/S        ROT SNAG        EXT SNAG Gentle 10"/10ea       Machine Row                        Ther Activity        Chop/Lift        Box Lift                                Modalities                        Self Care: Provided considerable education regarding cervical anatomy and muscle imbalances. Provided patient c/ handout c/ HEP. Patient demonstrated each exercise c/ good form and verbalized understanding of expectations c/ HEP. Access Code: [email or web address]  URL: [email or web address]  Date: 10/10/2023  Prepared by: Sara Heck    Exercises  - Mid-Lower Cervical Extension SNAG with Strap  - 1 x daily - 7 x weekly - 10 reps - 5-10 seconds hold  - Shoulder External Rotation and Scapular Retraction with Resistance  - 1 x daily - 7 x weekly - 15 reps - 5 seconds hold  - Standing Shoulder Row with Anchored Resistance  - 1 x daily - 7 x weekly - 2-3 sets - 10 reps  - Seated Cervical Retraction  - 1 x daily - 7 x weekly - 10 reps - 5 seconds hold

## 2023-10-10 NOTE — PROGRESS NOTES
PT Evaluation     Today's date: 10/10/2023  Patient name: Devin Murray  : 1950  MRN: [id number]  Referring provider: Tete Meeks MD  Dx:   Encounter Diagnosis     ICD-10-CM    1. Cervicalgia  M54.2       2. Carpal tunnel syndrome, bilateral  G56.03           Start Time: 1105  Stop Time: 1220  Total time in clinic (min): 75 minutes    Assessment  Assessment details: Patient is a 74 y.o. male with medical diagnosis of bilateral carpal tunnel syndrome and cervicalgia. Following a thorough physical therapy evaluation, the patient demonstrates objective impairments in bilateral  strength, cervical ROM, deep cervical flexor strength, and posterior chain stability. Functionally, symptoms in distal extremities are impacting his ability to open containers at home, play piano/organ s/ pain and bike s/ pain in BUE. Provided considerable education regarding cervical anatomy and muscle imbalances. Provided patient c/ handout c/ HEP. Patient demonstrated each exercise c/ good form and verbalized understanding of expectations c/ HEP. Patient will benefit from skilled physical therapy treatment to address the aforementioned impairments and functional deficits, and accomplish patient goals. .   Impairments: abnormal or restricted ROM, abnormal movement, activity intolerance, impaired physical strength, lacks appropriate home exercise program, pain with function, scapular dyskinesis and poor posture   Functional limitations: opening containers, play piano/organ, bike  Goals  STG (3 weeks): Increase cervical extension by 5 degrees. Able to perform scap TB row c/ proper form and use of scapular stabilizers. Able to perform chin tucks in seated s/ assistance. LTG (6 weeks):  Cervical ROM WNL. 75% improvement in b/l wrist and hand pain and N/T. Patient will be independent with HEP in 6 weeks to allow independent management of condition.      Plan  Plan details: TE, NMR, TA, MT, self-care, and modalities as needed in order to progress through skilled PT focused on ROM, strength, balance, motor control. Patient would benefit from: skilled physical therapy  Planned modality interventions: cryotherapy and thermotherapy: hydrocollator packs  Planned therapy interventions: manual therapy, neuromuscular re-education, patient education, self care, therapeutic activities, therapeutic exercise and home exercise program  Frequency: 2x week  Duration in weeks: 6  Plan of Care beginning date: 10/10/2023  Plan of Care expiration date: 2023  Treatment plan discussed with: patient        Subjective Evaluation    History of Present Illness  Mechanism of injury: IE: Patient is a 74 y.o. male presenting with bilateral hand N/T. Current symptoms started in left shoulder, progressed to right shoulder, then progressed to b/l hands and has intensified since specifically in August and 2023. Symptoms worsen c/ sedentary behavior. Advil and tylenol improve symptoms slightly. Has had x-rays of both left & right shoulder and neck. (-) for fracture. Wears wrist braces b/l at night which helps c/ pain moderately, improves swelling as well. Has been completing several hand squeezing exercises c/ ball. Ices wrist regularly, c/ moderate temporary effect on pain. Bikes approx 50 miles per week. Plays piano and organs. Gets a weekly 90 min full body massage. Patients primary goals for PT are to be able to piano and organ and have no symptoms in b/l hands. Patient Goals  Patient goals for therapy: increased strength, decreased pain, increased motion and return to sport/leisure activities    Pain  Current pain rating: 3  At best pain ratin  At worst pain rating: 10      Diagnostic Tests  X-ray: abnormal (disc height loss and endplate and facet joint degenerative change at C5-6 and C6-7.)  Treatments  Previous treatment: medication        Objective    Observation:     -Posture:  Forward head, moderate thoracic kyphosis    Neuro Screen:  Edwards's: (-)  UE Clonus: (-)  DTRs:      -C5 (Biceps): 1+/2+     -C6 (Brachioradialis): 1+/1+     -C7 (Triceps): 2+/2+    Cervical ROM (degrees):    IE  FLX:  50  EXT:  35  SB (R/L): 30/20  ROT (R/L):  40/40    Cervical Special Tests:  Spurlings (R/L): (-) for any change in either direction  Distraction: No change in symptoms.  Strength (Trial 1,2,3):   IE  Right:  25/25/15  Left: 15/20/20    Deep Cervical Flexor Endurance: Unable to maintain for >5". Thoracic Mobility: Significant limitation in rotation b/l and extension. Scapular Stability: Difficulty achieving scap retraction s/ tactile cues. Shoulder ROM: Pinches at end range flexion, abduction and IR.      Cervical Rotation Test: (-)      Flowsheet Rows      Flowsheet Row Most Recent Value   PT/OT G-Codes    Current Score 60   Projected Score 69               Precautions: None    Daily Treatment Diary:      Initial Evaluation Date: 10/10/23  POC Expiration: 11/21/23  Compliance 10/10/23                     Visit Number 1                    Re-Eval  IE                 MC   Foto Captured Y                        Date 10/10       Manuals        Manual Traction KS       SOR        Closing Mob                                Neuro Re-Ed        [employer] nv       Deep Cervical Flexion        Scap Retract        Prone Scap Stab        [employer]: Row, Ext, HABD nv       SA Punch        Bilateral ER GTB 5"/20       Bilateral ABD nv       Ball FLX/Scap/ABD        TB Rows & EXT GTB x10                                       Ther Ex        Cervical Isometrics        Corner/Door Stretch        Seated TS Extension nv       Seated TS Rotation c/ C/S Fixed nv       Standing FLX/Scaption        Open Book        Foam Roller T/S        ROT SNAG        EXT SNAG Gentle 10"/10ea       Machine Row                        Ther Activity        Chop/Lift        Box Lift                                Modalities                        Self Care: Provided considerable education regarding cervical anatomy and muscle imbalances. Provided patient c/ handout c/ HEP. Patient demonstrated each exercise c/ good form and verbalized understanding of expectations c/ HEP. Access Code: [email or web address]  URL: [email or web address]  Date: 10/10/2023  Prepared by: Shin Bryan    Exercises  - Mid-Lower Cervical Extension SNAG with Strap  - 1 x daily - 7 x weekly - 10 reps - 5-10 seconds hold  - Shoulder External Rotation and Scapular Retraction with Resistance  - 1 x daily - 7 x weekly - 15 reps - 5 seconds hold  - Standing Shoulder Row with Anchored Resistance  - 1 x daily - 7 x weekly - 2-3 sets - 10 reps  - Seated Cervical Retraction  - 1 x daily - 7 x weekly - 10 reps - 5 seconds hold

## 2023-10-12 ENCOUNTER — OFFICE VISIT (OUTPATIENT)
Dept: PHYSICAL THERAPY | Facility: CLINIC | Age: 73
End: 2023-10-12
Payer: MEDICARE

## 2023-10-12 DIAGNOSIS — G56.03 CARPAL TUNNEL SYNDROME, BILATERAL: ICD-10-CM

## 2023-10-12 DIAGNOSIS — M54.2 CERVICALGIA: Primary | ICD-10-CM

## 2023-10-12 PROCEDURE — 97112 NEUROMUSCULAR REEDUCATION: CPT

## 2023-10-12 PROCEDURE — 97110 THERAPEUTIC EXERCISES: CPT

## 2023-10-12 PROCEDURE — 97140 MANUAL THERAPY 1/> REGIONS: CPT

## 2023-10-12 NOTE — PROGRESS NOTES
Daily Note     Today's date: 10/12/2023  Patient name: Oma Parrish  : 1950  MRN: [id number]  Referring provider: Stella Santizo MD  Dx:   Encounter Diagnosis     ICD-10-CM    1. Cervicalgia  M54.2       2. Carpal tunnel syndrome, bilateral  G56.03           Start Time: 935  Stop Time: 1039  Total time in clinic (min): 64 minutes    Subjective: Pain lessened about 90% since IE appt. Numbness in distal fingers persisted. Slight pain along ulnar nerve by guyons canal.       Objective: See treatment diary below      Assessment: Tolerated treatment well. Patient demonstrated fatigue post treatment and would benefit from continued PT. Demonstrates (+) response to manual traction c/ patient reports of lessened numbness in distal phalanx. Required frequent verbal cues to perform chin tucks and scapular exercises c/ proper technique. Plan: Continue per plan of care.       Precautions: None    Daily Treatment Diary:      Initial Evaluation Date: 10/10/23  POC Expiration: 23  Compliance 10/10/23 10/12                   Visit Number 1 2                   Re-Eval  IE                 MC   Foto Captured Y                        Date 10/10 10/12      Manuals        Manual Traction KS KS      SOR        Closing Mob                                Neuro Re-Ed        [employer] 5"/10      Deep Cervical Flexion        Scap Retract        Prone Scap Stab        [employer]: Row, Ext, HABD nv nv      SA Punch        Bilateral ER GTB 5"/20 GTB 5"/20      Bilateral HABD nv GTB 2/10      Ball FLX/Scap/ABD        TB Rows & EXT GTB x10 BlTB 2/10                                      Ther Ex        Cervical Isometrics        Corner/Door Stretch        Seated TS Extension nv 5"/10      Seated TS Rotation c/ C/S Fixed nv nv      Standing FLX/Scaption        Open Book        Foam Roller T/S        ROT SNAG        EXT SNAG Gentle 10"/10ea Gentle 10"/10ea      Machine Row        UBE  2'/2'              Ther Activity Chop/Lift        Box Lift                                Modalities                            Access Code: [email or web address]  URL: [email or web address]  Date: 10/10/2023  Prepared by: Layton Beard    Exercises  - Mid-Lower Cervical Extension SNAG with Strap  - 1 x daily - 7 x weekly - 10 reps - 5-10 seconds hold  - Shoulder External Rotation and Scapular Retraction with Resistance  - 1 x daily - 7 x weekly - 15 reps - 5 seconds hold  - Standing Shoulder Row with Anchored Resistance  - 1 x daily - 7 x weekly - 2-3 sets - 10 reps  - Seated Cervical Retraction  - 1 x daily - 7 x weekly - 10 reps - 5 seconds hold

## 2023-10-16 ENCOUNTER — OFFICE VISIT (OUTPATIENT)
Dept: PHYSICAL THERAPY | Facility: CLINIC | Age: 73
End: 2023-10-16
Payer: MEDICARE

## 2023-10-16 DIAGNOSIS — M54.2 CERVICALGIA: Primary | ICD-10-CM

## 2023-10-16 DIAGNOSIS — G56.03 CARPAL TUNNEL SYNDROME, BILATERAL: ICD-10-CM

## 2023-10-16 PROCEDURE — 97112 NEUROMUSCULAR REEDUCATION: CPT

## 2023-10-16 PROCEDURE — 97110 THERAPEUTIC EXERCISES: CPT

## 2023-10-16 PROCEDURE — 97140 MANUAL THERAPY 1/> REGIONS: CPT

## 2023-10-16 NOTE — PROGRESS NOTES
Daily Note     Today's date: 10/16/2023  Patient name: Raylene Bumpers  : 1950  MRN: [id number]  Referring provider: Sunny Mitchell MD  Dx:   Encounter Diagnosis     ICD-10-CM    1. Cervicalgia  M54.2       2. Carpal tunnel syndrome, bilateral  G56.03           Start Time: 1050  Stop Time: 1146  Total time in clinic (min): 56 minutes    Subjective: Patient reports pain and N/T has lessened in B wrists since beginning PT, mostly localized to fingertips at this points, but varies t/o the day. Objective: See treatment diary below      Assessment: Tolerated treatment well. Patient demonstrated fatigue post treatment, exhibited good technique with therapeutic exercises, and would benefit from continued PT. Reviewed HEP exercises t/o session c/ patient to ensure proper technique at home. UEs fatigued c/ b/l ER as noted by increased shaking of musculature c/ each repetition. Plan: Continue per plan of care.       Precautions: None    Daily Treatment Diary:      Initial Evaluation Date: 10/10/23  POC Expiration: 23  Compliance 10/10/23 10/12 10/16                 Visit Number 1 2 3                 Re-Eval  IE                [facility]   Foto Captured Y                       Date 10/10 10/12 10/16     Manuals        Manual Traction KS KS KS     SOR        Closing Mob                                Neuro Re-Ed        [employer] nv 5"/10 5"/10     Deep Cervical Flexion        Scap Retract        Prone Scap Stab        [employer]: Row, Ext, HABD nv nv np     SA Punch        Bilateral ER GTB 5"/20 GTB 5"/20 GTB 5"/20     Bilateral HABD nv GTB 2/10 nv     Ball FLX/Scap/ABD        TB Rows & EXT GTB x10 BlTB 2/10 BlTB 2/10                                     Ther Ex        Cervical Isometrics        Corner/Door Stretch        Seated TS Extension nv 5"/10 10"/10     Seated TS Rotation c/ C/S Fixed nv nv x20ea     Standing FLX/Scaption        Open Book        Foam Roller T/S        ROT SNAG        EXT SNAG Gentle 10"/10ea Gentle 10"/10ea Gentle 10"/10     Machine Row        UBE  2'/2' 2'/2' Lvl 2  80/70 rpm             Ther Activity        Chop/Lift        Box Lift                                Modalities                            Access Code: [email or web address]  URL: [email or web address]  Date: 10/10/2023  Prepared by: Layton Beard    Exercises  - Mid-Lower Cervical Extension SNAG with Strap  - 1 x daily - 7 x weekly - 10 reps - 5-10 seconds hold  - Shoulder External Rotation and Scapular Retraction with Resistance  - 1 x daily - 7 x weekly - 15 reps - 5 seconds hold  - Standing Shoulder Row with Anchored Resistance  - 1 x daily - 7 x weekly - 2-3 sets - 10 reps  - Seated Cervical Retraction  - 1 x daily - 7 x weekly - 10 reps - 5 seconds hold

## 2023-10-19 ENCOUNTER — OFFICE VISIT (OUTPATIENT)
Dept: PHYSICAL THERAPY | Facility: CLINIC | Age: 73
End: 2023-10-19
Payer: MEDICARE

## 2023-10-19 DIAGNOSIS — G56.03 CARPAL TUNNEL SYNDROME, BILATERAL: ICD-10-CM

## 2023-10-19 DIAGNOSIS — M54.2 CERVICALGIA: Primary | ICD-10-CM

## 2023-10-19 PROCEDURE — 97110 THERAPEUTIC EXERCISES: CPT

## 2023-10-19 PROCEDURE — 97112 NEUROMUSCULAR REEDUCATION: CPT

## 2023-10-19 PROCEDURE — 97140 MANUAL THERAPY 1/> REGIONS: CPT

## 2023-10-19 NOTE — PROGRESS NOTES
Daily Note     Today's date: 10/19/2023  Patient name: Ji Garcia  : 1950  MRN: [id number]  Referring provider: Arlene Forde MD  Dx:   Encounter Diagnosis     ICD-10-CM    1. Cervicalgia  M54.2       2. Carpal tunnel syndrome, bilateral  G56.03           Start Time: 1305  Stop Time: 1350  Total time in clinic (min): 45 minutes    Subjective: Reports intensity of symptoms is still less than when he began PT. Has EMG next week. Objective: See treatment diary below      Assessment: Tolerated treatment well. Patient demonstrated fatigue post treatment and would benefit from continued PT. Requires frequent verbal cues for technique c/ T/S rotation and cervical extension SNAG. Reports relief of symptoms into b/l hands c/ C/s manual traction. Plan: Continue per plan of care.       Precautions: None    Daily Treatment Diary:      Initial Evaluation Date: 10/10/23  POC Expiration: 23  Compliance 10/10/23 10/12 10/16  10/19               Visit Number 1 2 3  4               Re-Eval  IE                [facility]   Foto Captured Y                       Date 10/10 10/12 10/16 10/19    Manuals        Manual Traction KS KS KS KS    SOR        Closing Mob                                Neuro Re-Ed        [employer] nv 5"/10 5"/10 5"/10    Deep Cervical Flexion        Scap Retract        Prone Scap Stab        [employer]: Row, Ext, HABD nv nv np     SA Punch        Bilateral ER GTB 5"/20 GTB 5"/20 GTB 5"/20 GTB 5"/20    Bilateral HABD nv GTB 2/10 nv GTB 2/10    Ball FLX/Scap/ABD        TB Rows & EXT GTB x10 BlTB 2/10 BlTB 2/10 BLTB 2/10                                    Ther Ex        Cervical Isometrics        Corner/Door Stretch        Seated TS Extension nv 5"/10 10"/10 10"/10    Seated TS Rotation c/ C/S Fixed nv nv x20ea x20ea    Standing FLX/Scaption        [employer]        Foam Roller T/S        ROT SNAG        EXT SNAG Gentle 10"/10ea Gentle 10"/10ea Gentle 10"/10 Gentle 10"/10    [employer] UBE  2'/2' 2'/2' Lvl 2  80/70 rpm 2'/2' Lvl 2  80/70 rpm            Ther Activity        Chop/Lift        Box Lift                                Modalities                            Access Code: [email or web address]  URL: [email or web address]  Date: 10/10/2023  Prepared by: Mikayla Kendall    Exercises  - Mid-Lower Cervical Extension SNAG with Strap  - 1 x daily - 7 x weekly - 10 reps - 5-10 seconds hold  - Shoulder External Rotation and Scapular Retraction with Resistance  - 1 x daily - 7 x weekly - 15 reps - 5 seconds hold  - Standing Shoulder Row with Anchored Resistance  - 1 x daily - 7 x weekly - 2-3 sets - 10 reps  - Seated Cervical Retraction  - 1 x daily - 7 x weekly - 10 reps - 5 seconds hold

## 2023-10-23 ENCOUNTER — OFFICE VISIT (OUTPATIENT)
Dept: PHYSICAL THERAPY | Facility: CLINIC | Age: 73
End: 2023-10-23
Payer: MEDICARE

## 2023-10-23 DIAGNOSIS — M54.2 CERVICALGIA: Primary | ICD-10-CM

## 2023-10-23 DIAGNOSIS — G56.03 CARPAL TUNNEL SYNDROME, BILATERAL: ICD-10-CM

## 2023-10-23 PROCEDURE — 97110 THERAPEUTIC EXERCISES: CPT

## 2023-10-23 PROCEDURE — 97112 NEUROMUSCULAR REEDUCATION: CPT

## 2023-10-23 PROCEDURE — 97140 MANUAL THERAPY 1/> REGIONS: CPT

## 2023-10-23 NOTE — PROGRESS NOTES
Daily Note     Today's date: 10/23/2023  Patient name: Nicky Black  : 1950  MRN: [id number]  Referring provider: Kaci Granados MD  Dx:   Encounter Diagnosis     ICD-10-CM    1. Cervicalgia  M54.2       2. Carpal tunnel syndrome, bilateral  G56.03           Start Time: 918  Stop Time: 1020  Total time in clinic (min): 62 minutes    Subjective: Reports he still gets symptoms along medial wrist and fingertips, but full hand and wrist symptoms have dissipated and mostly resolved. EMG scheduled for this week and f/u c/ hand doctor following week. Objective: See treatment diary below      Assessment: Tolerated treatment well. Patient demonstrated fatigue post treatment, exhibited good technique with therapeutic exercises, and would benefit from continued PT. Required less verbal cues for technique this visit. Very limited thoracic mobility noted during AAROM and TE interventions this visit. Improved slightly c/ AROM and MT interventions. Plan: Continue per plan of care.       Precautions: None    Daily Treatment Diary:      Initial Evaluation Date: 10/10/23  POC Expiration: 23  Compliance 10/10/23 10/12 10/16  10/19  10/23             Visit Number 1 2 3  4  5             Re-Eval  IE                [facility]   [employer] [facility] Y                       Date 10/10 10/12 10/16 10/19 10/23   Manuals        Manual Traction KS KS KS KS KS   SOR        Closing Mob        T/S Ext     KS AAROM                   Neuro Re-Ed        Chin Tuck nv 5"/10 5"/10 5"/10 5"/10   Deep Cervical Flexion        Scap Retract        Prone Scap Stab        Blackburns: Row, Ext nv nv np  Bent Over 2# 2/10ea   SA Punch        Bilateral ER GTB 5"/20 GTB 5"/20 GTB 5"/20 GTB 5"/20 GTB 5"/20   Bilateral HABD nv GTB 2/10 nv GTB 2/10 BlTB 2/10   Ball FLX/Scap/ABD        TB Rows & EXT GTB x10 BlTB 2/10 BlTB 2/10 BLTB 2/10 BlTB 2/10                                   Ther Ex        Cervical Isometrics        Corner/Door Stretch Seated TS Extension nv 5"/10 10"/10 10"/10 10"/10   Seated TS Rotation c/ C/S Fixed nv nv x20ea x20ea x20ea   Standing FLX/Scaption        Open Book        Foam Roller T/S        ROT SNAG        EXT SNAG Gentle 10"/10ea Gentle 10"/10ea Gentle 10"/10 Gentle 10"/10 Gentle 10"/10   Machine Row        UBE  2'/2' 2'/2' Lvl 2  80/70 rpm 2'/2' Lvl 2  80/70 rpm 2'/2' Lvl 3 80/70 rpm           Ther Activity        Chop/Lift        Box Lift                                Modalities                            Access Code: [email or web address]  URL: [email or web address]  Date: 10/10/2023  Prepared by: Mikayla Kendall    Exercises  - Mid-Lower Cervical Extension SNAG with Strap  - 1 x daily - 7 x weekly - 10 reps - 5-10 seconds hold  - Shoulder External Rotation and Scapular Retraction with Resistance  - 1 x daily - 7 x weekly - 15 reps - 5 seconds hold  - Standing Shoulder Row with Anchored Resistance  - 1 x daily - 7 x weekly - 2-3 sets - 10 reps  - Seated Cervical Retraction  - 1 x daily - 7 x weekly - 10 reps - 5 seconds hold

## 2023-10-31 ENCOUNTER — OFFICE VISIT (OUTPATIENT)
Dept: PHYSICAL THERAPY | Facility: CLINIC | Age: 73
End: 2023-10-31
Payer: MEDICARE

## 2023-10-31 DIAGNOSIS — G56.03 CARPAL TUNNEL SYNDROME, BILATERAL: ICD-10-CM

## 2023-10-31 DIAGNOSIS — M54.2 CERVICALGIA: Primary | ICD-10-CM

## 2023-10-31 PROCEDURE — 97140 MANUAL THERAPY 1/> REGIONS: CPT

## 2023-10-31 PROCEDURE — 97110 THERAPEUTIC EXERCISES: CPT

## 2023-10-31 NOTE — PROGRESS NOTES
Daily Note     Today's date: 10/31/2023  Patient name: Sonia Simon  : 1950  MRN: [id number]  Referring provider: Domi Wilkins MD  Dx:   Encounter Diagnosis     ICD-10-CM    1. Cervicalgia  M54.2       2. Carpal tunnel syndrome, bilateral  G56.03           Start Time: 1015  Stop Time: 1105  Total time in clinic (min): 50 minutes    Subjective: Reports EMG irritated his hand symptoms for approx. 1-2 days. Has been completing HEP at home. Objective: See treatment diary below      Assessment: Tolerated treatment well. Patient demonstrated fatigue post treatment, exhibited good technique with therapeutic exercises, and would benefit from continued PT. Improved swelling of dorsal surface of b/l hands following retrograde STM c/ improved ability to  and make fist. Provided tendon glides following manual therapy interventions to continue to work on fist ROM. Provided copy of exercise for HEP. Plan: Continue per plan of care.       Precautions: None    Daily Treatment Diary:      Initial Evaluation Date: 10/10/23  POC Expiration: 23  Compliance 10/10/23 10/12 10/16  10/19  10/23  10/31           Visit Number 1 2 3  4  5  6           Re-Eval  IE                [facility] Captured Y                       Date 10/31 10/12 10/16 10/19 10/23   Manuals        Manual Traction KS KS KS KS KS   SOR        Closing Mob        T/S Ext     KS AAROM   B/L STM & PROM to Hands KS Retrograde + finger flexion               Neuro Re-Ed        Chin Tuck nv 5"/10 5"/10 5"/10 5"/10   Deep Cervical Flexion        Scap Retract        Prone Scap Stab        Blackburns: Row, Ext nv nv np  Bent Over 2# 2/10ea   SA Punch        Bilateral ER nv GTB 5"/20 GTB 5"/20 GTB 5"/20 GTB 5"/20   Bilateral HABD nv GTB 2/10 nv GTB 2/10 BlTB 2/10   Ball FLX/Scap/ABD        TB Rows & EXT nv BlTB 2/10 BlTB 2/10 BLTB 2/10 BlTB 2/10                                   Ther Ex        Cervical Isometrics        Corner/Door Stretch Seated TS Extension 10"/10 5"/10 10"/10 10"/10 10"/10   Seated TS Rotation c/ C/S Fixed nv nv x20ea x20ea x20ea   Standing FLX/Scaption        Open Book        Foam Roller T/S        ROT SNAG        EXT SNAG Gentle 10"/10ea Gentle 10"/10ea Gentle 10"/10 Gentle 10"/10 Gentle 10"/10   Machine Row        Tendon Glides x10ea       UBE 2'/2' Lvl 3 80/70 rpm 2'/2' 2'/2' Lvl 2  80/70 rpm 2'/2' Lvl 2  80/70 rpm 2'/2' Lvl 3 80/70 rpm           Ther Activity        Chop/Lift        Box Lift                                Modalities                            Access Code: [email or web address]  URL: [email or web address]  Date: 10/10/2023  Prepared by: Ovidio Semen    Exercises  - Mid-Lower Cervical Extension SNAG with Strap  - 1 x daily - 7 x weekly - 10 reps - 5-10 seconds hold  - Shoulder External Rotation and Scapular Retraction with Resistance  - 1 x daily - 7 x weekly - 15 reps - 5 seconds hold  - Standing Shoulder Row with Anchored Resistance  - 1 x daily - 7 x weekly - 2-3 sets - 10 reps  - Seated Cervical Retraction  - 1 x daily - 7 x weekly - 10 reps - 5 seconds hold

## 2023-11-02 ENCOUNTER — OFFICE VISIT (OUTPATIENT)
Dept: PHYSICAL THERAPY | Facility: CLINIC | Age: 73
End: 2023-11-02
Payer: MEDICARE

## 2023-11-02 DIAGNOSIS — G56.03 CARPAL TUNNEL SYNDROME, BILATERAL: ICD-10-CM

## 2023-11-02 DIAGNOSIS — M54.2 CERVICALGIA: Primary | ICD-10-CM

## 2023-11-02 PROCEDURE — 97140 MANUAL THERAPY 1/> REGIONS: CPT

## 2023-11-02 PROCEDURE — 97110 THERAPEUTIC EXERCISES: CPT

## 2023-11-02 NOTE — PROGRESS NOTES
Daily Note     Today's date: 2023  Patient name: Aislinn Allen  : 1950  MRN: [id number]  Referring provider: Malena Severe, MD  Dx:   Encounter Diagnosis     ICD-10-CM    1. Cervicalgia  M54.2       2. Carpal tunnel syndrome, bilateral  G56.03           Start Time: 1015  Stop Time: 1105  Total time in clinic (min): 50 minutes    Subjective: Reports he felt a little better after change in program at last visit. Has f/u c/ hand doctor on Friday. Objective: See treatment diary below      Assessment: Tolerated treatment well. Patient demonstrated fatigue post treatment and would benefit from continued PT. Reviewed technique c/ tendon glides.  strength on left increased slightly following manual C/S traction. Plan: Continue per plan of care.       Precautions: None    Daily Treatment Diary:      Initial Evaluation Date: 10/10/23  POC Expiration: 23  Compliance 10/10/23 10/12 10/16  10/19  10/23  10/31  11/2         Visit Number 1 2 3  4  5  6  7         Re-Eval  IE                [facility]   [employer] Y                       Date 10/31 11/2 10/16 10/19 10/23   Manuals        Manual Traction KS KS KS KS KS   SOR        Closing Mob        T/S Ext     KS AAROM   B/L STM & PROM to Hands KS Retrograde + finger flexion KS Retrograde +finger flexion BUE              Neuro Re-Ed        Chin Tuck nv nv 5"/10 5"/10 5"/10   Deep Cervical Flexion        Scap Retract        Prone Scap Stab        Blackburns: Row, Ext nv nv np  Bent Over 2# 2/10ea   SA Punch        Bilateral ER nv nv GTB 5"/20 GTB 5"/20 GTB 5"/20   Bilateral HABD nv nv nv GTB 2/10 BlTB 2/10   Ball FLX/Scap/ABD        TB Rows & EXT nv nv BlTB 2/10 BLTB 2/10 BlTB 2/10                                   Ther Ex        Cervical Isometrics        Corner/Door Stretch        Seated TS Extension 10"/10 5"/10 10"/10 10"/10 10"/10   Seated TS Rotation c/ C/S Fixed nv x20ea x20ea x20ea x20ea   Standing FLX/Scaption        Open Book        Foam Roller T/S        ROT SNAG        EXT SNAG Gentle 10"/10ea HEP Gentle 10"/10 Gentle 10"/10 Gentle 10"/10   Machine Row        Tendon Glides x10ea x10ea      UBE 2'/2' Lvl 3 80/70 rpm 2'/2' Lvl 3 2'/2' Lvl 2  80/70 rpm 2'/2' Lvl 2  80/70 rpm 2'/2' Lvl 3 80/70 rpm           Ther Activity        Chop/Lift        Box Lift                                Modalities                            Access Code: [email or web address]  URL: [email or web address]  Date: 10/10/2023  Prepared by: Mikayla Kendall    Exercises  - Mid-Lower Cervical Extension SNAG with Strap  - 1 x daily - 7 x weekly - 10 reps - 5-10 seconds hold  - Shoulder External Rotation and Scapular Retraction with Resistance  - 1 x daily - 7 x weekly - 15 reps - 5 seconds hold  - Standing Shoulder Row with Anchored Resistance  - 1 x daily - 7 x weekly - 2-3 sets - 10 reps  - Seated Cervical Retraction  - 1 x daily - 7 x weekly - 10 reps - 5 seconds hold

## 2023-11-06 ENCOUNTER — OFFICE VISIT (OUTPATIENT)
Dept: PHYSICAL THERAPY | Facility: CLINIC | Age: 73
End: 2023-11-06
Payer: MEDICARE

## 2023-11-06 DIAGNOSIS — G56.03 CARPAL TUNNEL SYNDROME, BILATERAL: ICD-10-CM

## 2023-11-06 DIAGNOSIS — M54.2 CERVICALGIA: Primary | ICD-10-CM

## 2023-11-06 PROCEDURE — 97110 THERAPEUTIC EXERCISES: CPT

## 2023-11-06 PROCEDURE — 97140 MANUAL THERAPY 1/> REGIONS: CPT

## 2023-11-06 NOTE — PROGRESS NOTES
Daily Note     Today's date: 2023  Patient name: Compa Schroeder  : 1950  MRN: [id number]  Referring provider: Yohana Kwong MD  Dx:   Encounter Diagnosis     ICD-10-CM    1. Cervicalgia  M54.2       2. Carpal tunnel syndrome, bilateral  G56.03           Start Time: 1430  Stop Time: 1530  Total time in clinic (min): 60 minutes    Subjective: Is scheduled for CT surgery on right on . Saw hand surgeon Friday. Objective: See treatment diary below      Assessment: Tolerated treatment well. Patient demonstrated fatigue post treatment, exhibited good technique with therapeutic exercises, and would benefit from continued PT. Improved fist ROM following manual therapy interventions. Reports improved numbness in distal phalanges following powerweb exercise. Plan: Continue per plan of care.       Precautions: None    Daily Treatment Diary:      Initial Evaluation Date: 10/10/23  POC Expiration: 23  Compliance 10/10/23 10/12 10/16  10/19  10/23  10/31  11/2  11/6       Visit Number 1 2 3  4  5  6  7  8       Re-Eval  IE                [address] [facility]                       Date 10/31 11/2 11/6  10/23   Manuals        Manual Traction KS KS KS  KS   SOR        Closing Mob        T/S Ext     KS AAROM   B/L STM & PROM to Hands KS Retrograde + finger flexion KS Retrograde +finger flexion BUE KS Retrograde +finger flexion BUE             Neuro Re-Ed        Chin Tuck nv nv 5"/10  5"/10   Deep Cervical Flexion        Scap Retract        Prone Scap Stab        Blackburns: Row, Ext nv nv np  Bent Over 2# 2/10ea   SA Punch        Bilateral ER nv nv np  GTB 5"/20   Bilateral HABD nv nv np  BlTB 2/10   Ball FLX/Scap/ABD        TB Rows & EXT nv nv np  BlTB 2/10                                   Ther Ex                        Seated TS Extension 10"/10 5"/10 10"/10  10"/10   Seated TS Rotation c/ C/S Fixed nv x20ea x20ea  x20ea   Standing FLX/Scaption        Open Book        Foam Roller T/S ROT SNAG        EXT SNAG Gentle 10"/10ea HEP   Gentle 10"/10   Machine Row        Tendon Glides x10ea x10ea x10     UBE 2'/2' Lvl 3 80/70 rpm 2'/2' Lvl 3 2'/2' Lvl 3.3  80/70 rpm  2'/2' Lvl 3 80/70 rpm   Gripper   1' ea Red     Therabar U and N   X20ea Red     Powerweb   X20ea Red             Ther Activity        Chop/Lift        Box Lift                                Modalities                            Access Code: [email or web address]  URL: [email or web address]  Date: 10/10/2023  Prepared by: Chen Del Rio    Exercises  - Mid-Lower Cervical Extension SNAG with Strap  - 1 x daily - 7 x weekly - 10 reps - 5-10 seconds hold  - Shoulder External Rotation and Scapular Retraction with Resistance  - 1 x daily - 7 x weekly - 15 reps - 5 seconds hold  - Standing Shoulder Row with Anchored Resistance  - 1 x daily - 7 x weekly - 2-3 sets - 10 reps  - Seated Cervical Retraction  - 1 x daily - 7 x weekly - 10 reps - 5 seconds hold

## 2023-11-08 ENCOUNTER — APPOINTMENT (OUTPATIENT)
Dept: PHYSICAL THERAPY | Facility: CLINIC | Age: 73
End: 2023-11-08
Payer: MEDICARE

## 2023-11-09 ENCOUNTER — OFFICE VISIT (OUTPATIENT)
Dept: PHYSICAL THERAPY | Facility: CLINIC | Age: 73
End: 2023-11-09
Payer: MEDICARE

## 2023-11-09 DIAGNOSIS — G56.03 CARPAL TUNNEL SYNDROME, BILATERAL: ICD-10-CM

## 2023-11-09 DIAGNOSIS — M54.2 CERVICALGIA: Primary | ICD-10-CM

## 2023-11-09 PROCEDURE — 97140 MANUAL THERAPY 1/> REGIONS: CPT

## 2023-11-09 PROCEDURE — 97110 THERAPEUTIC EXERCISES: CPT

## 2023-11-09 NOTE — PROGRESS NOTES
Daily Note     Today's date: 2023  Patient name: Bisi Lee  : 1950  MRN: [id number]  Referring provider: Marito Cruz MD  Dx:   Encounter Diagnosis     ICD-10-CM    1. Cervicalgia  M54.2       2. Carpal tunnel syndrome, bilateral  G56.03           Start Time: 1105  Stop Time: 1155  Total time in clinic (min): 50 minutes    Subjective: Reports he thinks the finger extension web really helped c/ distal phalange N/T. Objective: See treatment diary below      Assessment: Tolerated treatment well. Patient demonstrated fatigue post treatment, exhibited good technique with therapeutic exercises, and would benefit from continued PT. Increased muscular endurance noted c/ gripper and finger web noted as he was able to complete greater repetitions this visit. Will plan to progress resistance at nv. Plan: Continue per plan of care.       Precautions: None    Daily Treatment Diary:      Initial Evaluation Date: 10/10/23  POC Expiration: 23  Compliance 10/10/23 10/12 10/16  10/19  10/23  10/31  11/2  11/6  11/9     Visit Number 1 2 3  4  5  6  7  8  9     Re-Eval  IE                [address] [facility] Capt[facility] Y                       Date 10/31 11/2 11/6 11/9 10/23   Manuals        Manual Traction KS KS KS KS C/S KS   SOR        Closing Mob        T/S Ext     KS AAROM   B/L STM & PROM to Hands KS Retrograde + finger flexion KS Retrograde +finger flexion BUE KS Retrograde +finger flexion BUE KS Retrograde + finger flexion BUE            Neuro Re-Ed        Chin Tuck nv nv 5"/10 HEP 5"/10   Blackburns: Row, Ext nv nv np HEP Bent Over 2# 2/10ea   SA Punch        Bilateral ER nv nv np HEP GTB 5"/20   Bilateral HABD nv nv np HEP BlTB 2/10   TB Rows & EXT nv nv np HEP BlTB 2/10                                   Ther Ex        Seated TS Extension 10"/10 5"/10 10"/10 HEP 10"/10   Seated TS Rotation c/ C/S Fixed nv x20ea x20ea HEP x20ea   Standing FLX/Scaption        Open Book        Foam Roller T/S        ROT SNAG        EXT SNAG Gentle 10"/10ea HEP   Gentle 10"/10   Machine Row        Tendon Glides x10ea x10ea x10 x10    UBE 2'/2' Lvl 3 80/70 rpm 2'/2' Lvl 3 2'/2' Lvl 3.3  80/70 rpm 2'/2' Lvl 3.3  80/70 rpm 2'/2' Lvl 3 80/70 rpm   Gripper   1' ea Red 100x Red    Therabar U and N   X20ea Red X20 Red    Powerweb   X20ea Red X50ea Red            Ther Activity        Chop/Lift        Box Lift                                Modalities                            Access Code: [email or web address]  URL: [email or web address]  Date: 10/10/2023  Prepared by: Tiff Shaper    Exercises  - Mid-Lower Cervical Extension SNAG with Strap  - 1 x daily - 7 x weekly - 10 reps - 5-10 seconds hold  - Shoulder External Rotation and Scapular Retraction with Resistance  - 1 x daily - 7 x weekly - 15 reps - 5 seconds hold  - Standing Shoulder Row with Anchored Resistance  - 1 x daily - 7 x weekly - 2-3 sets - 10 reps  - Seated Cervical Retraction  - 1 x daily - 7 x weekly - 10 reps - 5 seconds hold

## 2023-11-14 ENCOUNTER — EVALUATION (OUTPATIENT)
Dept: PHYSICAL THERAPY | Facility: CLINIC | Age: 73
End: 2023-11-14
Payer: MEDICARE

## 2023-11-14 DIAGNOSIS — G56.03 CARPAL TUNNEL SYNDROME, BILATERAL: ICD-10-CM

## 2023-11-14 DIAGNOSIS — M54.2 CERVICALGIA: Primary | ICD-10-CM

## 2023-11-14 PROCEDURE — 97110 THERAPEUTIC EXERCISES: CPT

## 2023-11-14 PROCEDURE — 97140 MANUAL THERAPY 1/> REGIONS: CPT

## 2023-11-14 NOTE — PROGRESS NOTES
PT Re-Evaluation     Today's date: 2023  Patient name: Sheridan Akhtar  : 1950  MRN: [id number]  Referring provider: Deshaun Gomez MD  Dx:   Encounter Diagnosis     ICD-10-CM    1. Cervicalgia  M54.2       2. Carpal tunnel syndrome, bilateral  G56.03           Start Time: 1300  Stop Time: 1350  Total time in clinic (min): 50 minutes    Assessment  Assessment details: Patient is a 74 y.o. male with medical diagnosis of bilateral carpal tunnel syndrome and cervicalgia. Since beginning PT, patient demonstrates improvements in cervical ROM and slight improvements in  strength bilaterally. He continues to be limited in thoracic rotation and extension, hand and wrist PROM and  strength. He is progressing towards his LTGs, but they are not met at this time. Patient will continue to benefit from skilled PT interventions to address remaining impairments and functional limitations. He is scheduled for R carpal tunnel surgery next 23. Impairments: abnormal or restricted ROM, abnormal movement, activity intolerance, impaired physical strength, lacks appropriate home exercise program, pain with function, scapular dyskinesis and poor posture   Functional limitations: opening containers, play piano/organ, bike  Goals  STG (3 weeks): Increase cervical extension by 5 degrees. Met. Able to perform scap TB row c/ proper form and use of scapular stabilizers. Progressing. Able to perform chin tucks in seated s/ assistance. Progressing. Full fist ROM. New . LTG (6 weeks): Progressing. Cervical ROM WNL. 75% improvement in b/l wrist and hand pain and N/T. Patient will be independent with HEP in 6 weeks to allow independent management of condition. Plan  Plan details: TE, NMR, TA, MT, self-care, and modalities as needed in order to progress through skilled PT focused on ROM, strength, balance, motor control.    Patient would benefit from: skilled physical therapy  Planned modality interventions: cryotherapy and thermotherapy: hydrocollator packs  Planned therapy interventions: manual therapy, neuromuscular re-education, patient education, self care, therapeutic activities, therapeutic exercise and home exercise program  Frequency: 2x week  Duration in weeks: 6  Plan of Care beginning date: 2023  Plan of Care expiration date: 2023  Treatment plan discussed with: patient        Subjective Evaluation    History of Present Illness  Mechanism of injury: IE: Patient is a 74 y.o. male presenting with bilateral hand N/T. Current symptoms started in left shoulder, progressed to right shoulder, then progressed to b/l hands and has intensified since specifically in August and 2023. Symptoms worsen c/ sedentary behavior. Advil and tylenol improve symptoms slightly. Has had x-rays of both left & right shoulder and neck. (-) for fracture. Wears wrist braces b/l at night which helps c/ pain moderately, improves swelling as well. Has been completing several hand squeezing exercises c/ ball. Ices wrist regularly, c/ moderate temporary effect on pain. Bikes approx 50 miles per week. Plays piano and organs. Gets a weekly 90 min full body massage. Patients primary goals for PT are to be able to piano and organ and have no symptoms in b/l hands. UPDATE RE : Patient reports >50% improvement in b/l hand symptoms since beginning PT. % improvements gets as high at 70% immediately following PT. Reports he is able to do more activities after PT sessions, but only lasts until he goes to bed. Noting progress in ability to make a fist and region of symptoms. Continues to get pain in b/l wrist, but reports improvements in the numbness and tingling of b/l hands.    Patient Goals  Patient goals for therapy: increased strength, decreased pain, increased motion and return to sport/leisure activities    Pain  Current pain rating: 3  At best pain ratin  At worst pain ratin      Diagnostic Tests  X-ray: abnormal (disc height loss and endplate and facet joint degenerative change at C5-6 and C6-7.)  Treatments  Previous treatment: medication        Objective    Observation:     -Posture: Forward head, moderate thoracic kyphosis    Neuro Screen:  Edwards's: (-)  UE Clonus: (-)  DTRs:      -C5 (Biceps): 1+/2+     -C6 (Brachioradialis): 1+/1+     -C7 (Triceps): 2+/2+    Cervical ROM (degrees):    IE  RE (11/14)  FLX:  50  65  EXT:  35  40  SB (R/L): 30/20  30/25  ROT (R/L):  40/40  40/45    Cervical Special Tests:  Spurlings (R/L): (-) for any change in either direction  Distraction: No change in symptoms.  Strength (Trial 1,2,3):   IE  RE (11/14)  Right:  25/25/15 20/25/25  Left: 15/20/20 20/20/20    Deep Cervical Flexor Endurance: Unable to maintain for >5". Thoracic Mobility: Significant limitation in rotation b/l and extension. Scapular Stability: Difficulty achieving scap retraction s/ tactile cues. Shoulder ROM: Pinches at end range flexion, abduction and IR.      Cervical Rotation Test: (-)      Flowsheet Rows      Flowsheet Row Most Recent Value   PT/OT G-Codes    Current Score 63   Projected Score 69                 Precautions: None    Daily Treatment Diary:      Initial Evaluation Date: 10/10/23  Re-evaluation: 11/14/23  POC Expiration: 12/26/23  Compliance 10/10/23 10/12 10/16  10/19  10/23  10/31  11/2  11/6  11/9  11/14   Visit Number 1 2 3  4  5  6  7  8  9 10   Re-Eval  IE                RE   Scout Cull       Date 10/31 11/2 11/6 11/9 11/14   Manuals        Manual Traction KS KS KS KS C/S KS   SOR        Closing Mob        T/S Ext     KS AAROM   B/L STM & PROM to Hands KS Retrograde + finger flexion KS Retrograde +finger flexion BUE KS Retrograde +finger flexion BUE KS Retrograde + finger flexion BUE KS PROM to hands   Assessment     KS   Neuro Re-Ed        Chin Tuck nv nv 5"/10 HEP    Blackburns: Row, Ext nv nv np HEP    SA Punch        Bilateral ER nv nv np HEP    Bilateral HABD nv nv np HEP    TB Rows & EXT nv nv np HEP                                    Ther Ex        Seated TS Extension 10"/10 5"/10 10"/10 HEP 10"/10   Seated TS Rotation c/ C/S Fixed nv x20ea x20ea HEP    Standing FLX/Scaption        Open Book        Foam Roller T/S        ROT SNAG        EXT SNAG Gentle 10"/10ea HEP      Machine Row        Tendon Glides x10ea x10ea x10 x10    UBE 2'/2' Lvl 3 80/70 rpm 2'/2' Lvl 3 2'/2' Lvl 3.3  80/70 rpm 2'/2' Lvl 3.3  80/70 rpm 2'/2' Lvl 3 80/70 rpm   Gripper   1' ea Red 100x Red 2' ea Green   [employer] U and N   X20ea Red X20 Red X20 Green   Powerweb   X20ea Red X50ea Red Eldonna Joseph           Ther Activity        Chop/Lift        Box Lift                                Modalities                            Access Code: [email or web address] Long  URL: [email or web address]  Date: 10/10/2023  Prepared by: Marquis Lopez    Exercises  - Mid-Lower Cervical Extension SNAG with Strap  - 1 x daily - 7 x weekly - 10 reps - 5-10 seconds hold  - Shoulder External Rotation and Scapular Retraction with Resistance  - 1 x daily - 7 x weekly - 15 reps - 5 seconds hold  - Standing Shoulder Row with Anchored Resistance  - 1 x daily - 7 x weekly - 2-3 sets - 10 reps  - Seated Cervical Retraction  - 1 x daily - 7 x weekly - 10 reps - 5 seconds hold

## 2023-11-14 NOTE — LETTER
November 15, 2023    Deshaun Gomez MD  [address]  [address] [place] [facility].    Patient: Sheridan Akhtar   YOB: 1950   Date of Visit: 2023     Encounter Diagnosis     ICD-10-CM    1. Cervicalgia  M54.2       2. Carpal tunnel syndrome, bilateral  G56.03           Dear Dr. Balta Parks: Thank you for your recent referral of Sheridan Akhtar. Please review the attached evaluation summary from Edgard's recent visit. Please verify that you agree with the plan of care by signing the attached order. If you have any questions or concerns, please do not hesitate to call. I sincerely appreciate the opportunity to share in the care of one of your patients and hope to have another opportunity to work with you in the near future. Sincerely,    Virginia Wyatt, PT      Referring Provider:      I certify that I have read the below Plan of Care and certify the need for these services furnished under this plan of treatment while under my care. Deshaun Gomez MD   [address]  [address] [address]  Via Fax: [phone]          PT Re-Evaluation     Today's date: 2023  Patient name: Sheridan Akhtar  : 1950  MRN: [id number]  Referring provider: Deshaun Gomez MD  Dx:   Encounter Diagnosis     ICD-10-CM    1. Cervicalgia  M54.2       2. Carpal tunnel syndrome, bilateral  G56.03           Start Time: 1300  Stop Time: 1350  Total time in clinic (min): 50 minutes    Assessment  Assessment details: Patient is a 74 y.o. male with medical diagnosis of bilateral carpal tunnel syndrome and cervicalgia. Since beginning PT, patient demonstrates improvements in cervical ROM and slight improvements in  strength bilaterally. He continues to be limited in thoracic rotation and extension, hand and wrist PROM and  strength. He is progressing towards his LTGs, but they are not met at this time.  Patient will continue to benefit from skilled PT interventions to address remaining impairments and functional limitations. He is scheduled for R carpal tunnel surgery next Monday 11/20/23. Impairments: abnormal or restricted ROM, abnormal movement, activity intolerance, impaired physical strength, lacks appropriate home exercise program, pain with function, scapular dyskinesis and poor posture   Functional limitations: opening containers, play piano/organ, bike  Goals  STG (3 weeks): Increase cervical extension by 5 degrees. Met. Able to perform scap TB row c/ proper form and use of scapular stabilizers. Progressing. Able to perform chin tucks in seated s/ assistance. Progressing. Full fist ROM. New 11/14. LTG (6 weeks): Progressing. Cervical ROM WNL. 75% improvement in b/l wrist and hand pain and N/T. Patient will be independent with HEP in 6 weeks to allow independent management of condition. Plan  Plan details: TE, NMR, TA, MT, self-care, and modalities as needed in order to progress through skilled PT focused on ROM, strength, balance, motor control. Patient would benefit from: skilled physical therapy  Planned modality interventions: cryotherapy and thermotherapy: hydrocollator packs  Planned therapy interventions: manual therapy, neuromuscular re-education, patient education, self care, therapeutic activities, therapeutic exercise and home exercise program  Frequency: 2x week  Duration in weeks: 6  Plan of Care beginning date: 11/14/2023  Plan of Care expiration date: 12/26/2023  Treatment plan discussed with: patient        Subjective Evaluation    History of Present Illness  Mechanism of injury: IE: Patient is a 74 y.o. male presenting with bilateral hand N/T. Current symptoms started in left shoulder, progressed to right shoulder, then progressed to b/l hands and has intensified since specifically in August and September 2023. Symptoms worsen c/ sedentary behavior. Advil and tylenol improve symptoms slightly.  Has had x-rays of both left & right shoulder and neck. (-) for fracture. Wears wrist braces b/l at night which helps c/ pain moderately, improves swelling as well. Has been completing several hand squeezing exercises c/ ball. Ices wrist regularly, c/ moderate temporary effect on pain. Bikes approx 50 miles per week. Plays piano and organs. Gets a weekly 90 min full body massage. Patients primary goals for PT are to be able to piano and organ and have no symptoms in b/l hands. UPDATE RE : Patient reports >50% improvement in b/l hand symptoms since beginning PT. % improvements gets as high at 70% immediately following PT. Reports he is able to do more activities after PT sessions, but only lasts until he goes to bed. Noting progress in ability to make a fist and region of symptoms. Continues to get pain in b/l wrist, but reports improvements in the numbness and tingling of b/l hands. Patient Goals  Patient goals for therapy: increased strength, decreased pain, increased motion and return to sport/leisure activities    Pain  Current pain rating: 3  At best pain ratin  At worst pain ratin      Diagnostic Tests  X-ray: abnormal (disc height loss and endplate and facet joint degenerative change at C5-6 and C6-7.)  Treatments  Previous treatment: medication        Objective    Observation:     -Posture: Forward head, moderate thoracic kyphosis    Neuro Screen:  Edwards's: (-)  UE Clonus: (-)  DTRs:      -C5 (Biceps): 1+/2+     -C6 (Brachioradialis): 1+/1+     -C7 (Triceps): 2+/2+    Cervical ROM (degrees):    IE  RE ()  FLX:  50  65  EXT:  35  40  SB (R/L): 30/20  30/25  ROT (R/L):  40/40  40/45    Cervical Special Tests:  Spurlings (R/L): (-) for any change in either direction  Distraction: No change in symptoms.  Strength (Trial 1,2,3):   IE  RE ()  Right:  25/25/15 20/25/25  Left: 15/20/20 20/20/20    Deep Cervical Flexor Endurance: Unable to maintain for >5".     Thoracic Mobility: Significant limitation in rotation b/l and extension. Scapular Stability: Difficulty achieving scap retraction s/ tactile cues. Shoulder ROM: Pinches at end range flexion, abduction and IR. Cervical Rotation Test: (-)      Flowsheet Rows      Flowsheet Row Most Recent Value   PT/OT G-Codes    Current Score 63   Projected Score 69                 Precautions: None    Daily Treatment Diary:      Initial Evaluation Date: 10/10/23  Re-evaluation: 11/14/23  POC Expiration: 12/26/23  Compliance 10/10/23 10/12 10/16  10/19  10/23  10/31  11/2  11/6  11/9  11/14   Visit Number 1 2 3  4  5  6  7  8  9 10   Re-Eval  IE                RE   Ggoo Goddard       Date 10/31 11/2 11/6 11/9 11/14   Manuals        Manual Traction KS KS KS KS C/S KS   SOR        Closing Mob        T/S Ext     KS AAROM   B/L STM & PROM to Hands KS Retrograde + finger flexion KS Retrograde +finger flexion BUE KS Retrograde +finger flexion BUE KS Retrograde + finger flexion BUE KS PROM to hands   Assessment     KS   Neuro Re-Ed        Chin Tuck nv nv 5"/10 HEP    Blackburns: Row, Ext nv nv np HEP    SA Punch        Bilateral ER nv nv np HEP    Bilateral HABD nv nv np HEP    TB Rows & EXT nv nv np HEP                                    Ther Ex        Seated TS Extension 10"/10 5"/10 10"/10 HEP 10"/10   Seated TS Rotation c/ C/S Fixed nv x20ea x20ea HEP    Standing FLX/Scaption        Open Book        Foam Roller T/S        ROT SNAG        EXT SNAG Gentle 10"/10ea HEP      Machine Row        Tendon Glides x10ea x10ea x10 x10    UBE 2'/2' Lvl 3 80/70 rpm 2'/2' Lvl 3 2'/2' Lvl 3.3  80/70 rpm 2'/2' Lvl 3.3  80/70 rpm 2'/2' Lvl 3 80/70 rpm   Gripper   1' ea Red 100x Red 2' ea Green   Therabar U and N   X20ea Red X20 Red X20 Green   Powerweb   X20ea Red X50ea Red Cyrilla Sam           Ther Activity        Chop/Lift        Box Lift                                Modalities                            Access Code: Jesse Canales  URL: [email or web address]  Date: 10/10/2023  Prepared by: Karen Garnica    Exercises  - Mid-Lower Cervical Extension SNAG with Strap  - 1 x daily - 7 x weekly - 10 reps - 5-10 seconds hold  - Shoulder External Rotation and Scapular Retraction with Resistance  - 1 x daily - 7 x weekly - 15 reps - 5 seconds hold  - Standing Shoulder Row with Anchored Resistance  - 1 x daily - 7 x weekly - 2-3 sets - 10 reps  - Seated Cervical Retraction  - 1 x daily - 7 x weekly - 10 reps - 5 seconds hold

## 2023-11-16 ENCOUNTER — OFFICE VISIT (OUTPATIENT)
Dept: PHYSICAL THERAPY | Facility: CLINIC | Age: 73
End: 2023-11-16
Payer: MEDICARE

## 2023-11-16 DIAGNOSIS — M54.2 CERVICALGIA: Primary | ICD-10-CM

## 2023-11-16 DIAGNOSIS — G56.03 CARPAL TUNNEL SYNDROME, BILATERAL: ICD-10-CM

## 2023-11-16 PROCEDURE — 97140 MANUAL THERAPY 1/> REGIONS: CPT

## 2023-11-16 PROCEDURE — 97110 THERAPEUTIC EXERCISES: CPT

## 2023-11-16 NOTE — PROGRESS NOTES
Daily Note     Today's date: 2023  Patient name: Sarah Nur  : 1950  MRN: [id number]  Referring provider: Vivi Dutton MD  Dx:   Encounter Diagnosis     ICD-10-CM    1. Cervicalgia  M54.2       2. Carpal tunnel syndrome, bilateral  G56.03           Start Time: 1603  Stop Time: 1655  Total time in clinic (min): 52 minutes    Subjective: Reports hands are feeling worse today, but notes it's later in the day and he had a busy day. Objective: See treatment diary below      Assessment: Tolerated treatment well. Patient demonstrated fatigue post treatment, exhibited good technique with therapeutic exercises, and would benefit from continued PT. Improved fist ROM following manual therapy interventions, followed with gripper c/ emphasis on maintaining gained ROM. Plan: Continue per plan of care.       Precautions: None    Daily Treatment Diary:      Initial Evaluation Date: 10/10/23  Re-evaluation: 23  POC Expiration: 23  Compliance 11/16 10/12 10/16  10/19  10/23  10/31  11/2  11/6  11/9  11/14   Visit Number 11 2 3  4  5  6  7  8  9 10   Re-Eval                  RE   Kimberly Talley       Date    Manuals        Manual Traction KS KS KS KS C/S KS   SOR        Closing Mob        T/S Ext     KS AAROM   B/L STM & PROM to Hands KS PROM to hands + finger flexion BUE KS Retrograde +finger flexion BUE KS Retrograde +finger flexion BUE KS Retrograde + finger flexion BUE KS PROM to hands   Assessment     KS   Neuro Re-Ed        Chin Tuck  nv 5"/10 HEP    Blackburns: Row, Ext  nv np HEP    SA Punch        Bilateral ER  nv np HEP    Bilateral HABD  nv np HEP    TB Rows & EXT  nv np HEP                                    Ther Ex        Seated TS Extension 10"/10 5"/10 10"/10 HEP 10"/10   Seated TS Rotation c/ C/S Fixed  x20ea x20ea HEP    Standing FLX/Scaption        Open Book        Foam Roller T/S        ROT SNAG        EXT SNAG  HEP Machine Row        Tendon Glides x10ea x10ea x10 x10    UBE 2'/2' Lvl 3.7 80/70 rpm 2'/2' Lvl 3 2'/2' Lvl 3.3  80/70 rpm 2'/2' Lvl 3.3  80/70 rpm 2'/2' Lvl 3 80/70 rpm   Gripper 2' ea Green  1' ea Red 100x Red 2' ea Green   Therabar U and N x20 green  X20ea Red X20 Red X20 Green   Powerweb x30ea green  X20ea Red X50ea Red Yunier Flank           Ther Activity        Chop/Lift        Box Lift                                Modalities                            Access Code: Octavio Ca  URL: [email or web address]  Date: 10/10/2023  Prepared by: Eliza Valentin    Exercises  - Mid-Lower Cervical Extension SNAG with Strap  - 1 x daily - 7 x weekly - 10 reps - 5-10 seconds hold  - Shoulder External Rotation and Scapular Retraction with Resistance  - 1 x daily - 7 x weekly - 15 reps - 5 seconds hold  - Standing Shoulder Row with Anchored Resistance  - 1 x daily - 7 x weekly - 2-3 sets - 10 reps  - Seated Cervical Retraction  - 1 x daily - 7 x weekly - 10 reps - 5 seconds hold

## 2023-12-05 ENCOUNTER — OFFICE VISIT (OUTPATIENT)
Dept: PHYSICAL THERAPY | Facility: CLINIC | Age: 73
End: 2023-12-05
Payer: MEDICARE

## 2023-12-05 DIAGNOSIS — Z98.890 S/P CARPAL TUNNEL RELEASE: Primary | ICD-10-CM

## 2023-12-05 DIAGNOSIS — M54.2 CERVICALGIA: ICD-10-CM

## 2023-12-05 DIAGNOSIS — G56.03 CARPAL TUNNEL SYNDROME, BILATERAL: ICD-10-CM

## 2023-12-05 PROCEDURE — 97140 MANUAL THERAPY 1/> REGIONS: CPT

## 2023-12-05 PROCEDURE — 97110 THERAPEUTIC EXERCISES: CPT

## 2023-12-05 NOTE — PROGRESS NOTES
PT Re-Evaluation     Today's date: 2023  Patient name: Bryan Akhtar  : 1950  MRN: [id number]  Referring provider: Milvia Zuñiga MD  Dx:   Encounter Diagnosis     ICD-10-CM    1. S/P carpal tunnel release  Z98.890       2. Carpal tunnel syndrome, bilateral  G56.03       3. Cervicalgia  M54.2           Start Time: 6075  Stop Time: 5882  Total time in clinic (min): 50 minutes    Assessment  Assessment details: Patient is a 74 y.o. male with medical diagnosis of bilateral carpal tunnel syndrome and cervicalgia. He is s/p R carpal tunnel release on 23. He demonstrates improved wrist extension and flexion ROM since surgery compared to left side. Held strength testing of R hand today secondary to post-op acuity. Will plan to integrate strengthening next week, as that will be 3 weeks post-op. Focused on education of AROM and stretching exercises this visit for R hand. Reviewed technique for tendon gliding exercises. He is progressing towards his LTGs, but they are not met at this time. Patient will continue to benefit from skilled PT interventions to address remaining impairments and functional limitations. Impairments: abnormal or restricted ROM, abnormal movement, activity intolerance, impaired physical strength, lacks appropriate home exercise program, pain with function, scapular dyskinesis and poor posture   Functional limitations: opening containers, play piano/organ, bike  Goals  STG (3 weeks): Increase cervical extension by 5 degrees. Met. Able to perform scap TB row c/ proper form and use of scapular stabilizers. Met. Able to perform chin tucks in seated s/ assistance. Met. Full fist ROM. Met. LTG (6 weeks): Progressing. Cervical ROM WNL.  Strength = and WNL b/l.   75% improvement in b/l wrist and hand pain and N/T. Patient will be independent with HEP in 6 weeks to allow independent management of condition.      Plan  Plan details: TE, NMR, TA, MT, self-care, and modalities as needed in order to progress through skilled PT focused on ROM, strength, balance, motor control. Patient would benefit from: skilled physical therapy  Planned modality interventions: cryotherapy and thermotherapy: hydrocollator packs  Planned therapy interventions: manual therapy, neuromuscular re-education, patient education, self care, therapeutic activities, therapeutic exercise and home exercise program  Frequency: 2x week  Duration in weeks: 6  Plan of Care beginning date: 12/5/2023  Plan of Care expiration date: 1/16/2024  Treatment plan discussed with: patient        Subjective Evaluation    History of Present Illness  Mechanism of injury: IE: Patient is a 74 y.o. male presenting with bilateral hand N/T. Current symptoms started in left shoulder, progressed to right shoulder, then progressed to b/l hands and has intensified since specifically in August and September 2023. Symptoms worsen c/ sedentary behavior. Advil and tylenol improve symptoms slightly. Has had x-rays of both left & right shoulder and neck. (-) for fracture. Wears wrist braces b/l at night which helps c/ pain moderately, improves swelling as well. Has been completing several hand squeezing exercises c/ ball. Ices wrist regularly, c/ moderate temporary effect on pain. Bikes approx 50 miles per week. Plays piano and organs. Gets a weekly 90 min full body massage. Patients primary goals for PT are to be able to piano and organ and have no symptoms in b/l hands. UPDATE RE 11/14: Patient reports >50% improvement in b/l hand symptoms since beginning PT. % improvements gets as high at 70% immediately following PT. Reports he is able to do more activities after PT sessions, but only lasts until he goes to bed. Noting progress in ability to make a fist and region of symptoms. Continues to get pain in b/l wrist, but reports improvements in the numbness and tingling of b/l hands.      UPDATE RE 12/5: Patient presents to PT s/p R carpal tunnel release on 23. Is awaiting scheduling for procedure on opposite side. Reports less swelling and more mobility in R hand since surgery. Numbness in bilateral tips of fingers feels like sand. L hand is still bothersome and painful. Does not currently have a f/u c/ hand surgeon. Patient Goals  Patient goals for therapy: increased strength, decreased pain, increased motion and return to sport/leisure activities    Pain  Current pain rating: 3  At best pain ratin  At worst pain ratin      Diagnostic Tests  X-ray: abnormal (disc height loss and endplate and facet joint degenerative change at C5-6 and C6-7.)  Treatments  Previous treatment: medication        Objective    Observation:     -Posture: Forward head, moderate thoracic kyphosis    Neuro Screen:  Edwards's: (-)  UE Clonus: (-)  DTRs:      -C5 (Biceps): 1+/2+     -C6 (Brachioradialis): 1+/1+     -C7 (Triceps): 2+/2+    Cervical ROM (degrees):    IE  RE ()  FLX:  50  65  EXT:  35  40  SB (R/L): 30/20  30/25  ROT (R/L):  40/40  40/45       Strength (Trial 1,2,3): Strength NT / secondary to post-op acuity of R hand, will plan to assess next week, once 3 weeks post-op. IE  RE () RE ()  Right:  25/25/15 20/25/25 NT  Left: 15/20/20 20/20/20 NT    Deep Cervical Flexor Endurance: Unable to maintain for >5". Thoracic Mobility: Significant limitation in rotation b/l and extension. Scapular Stability: Difficulty achieving scap retraction s/ tactile cues. Cervical Rotation Test: (-)    Wrist ROM (R/L):   IE  EXT: 57/46  FLX: 60/53    Wrist Strength (R/L): R NT secondary to post-op acuity, will test after 3 weeks post-op   RE  EXT:  FLX:    Opposition: Bilateral able to touch digit 5 to 1 tip to tip. Fist: Makes a full fist on R side. L limited at end range.  Unable to make full fist.          Precautions: None    Daily Treatment Diary:      Initial Evaluation Date: 10/10/23  Re-evaluation: 23, 23  POC Expiration: 1/16/24  Compliance 12/5 10/12 10/16  10/19  10/23  10/31  11/2  11/6  11/9  11/14   Visit Number 11 2 3  4  5  6  7  8  9 10   Re-Eval                  RE   Sissy Barrera       Date 12/5 11/2 11/6 11/9 11/14   Manuals        Manual Traction  KS KS KS C/S KS   SOR        Closing Mob        T/S Ext     KS AAROM   B/L STM & PROM to Hands KS Retrograde + finger flexion KS Retrograde +finger flexion BUE KS Retrograde +finger flexion BUE KS Retrograde + finger flexion BUE KS PROM to hands   Assessment     KS   Neuro Re-Ed                                        Ther Ex        Seated TS Extension 10"/10 5"/10 10"/10 HEP 10"/10   Seated TS Rotation c/ C/S Fixed  x20ea x20ea HEP    Standing FLX/Scaption        Tendon Glides x20ea + Education x10ea x10 x10    Wrist AROM: F/E, RD/UD 2/10ea       Wrst Flexor Stretch 10"/10       UBE 2'/2' Lvl 3 80/70 rpm 2'/2' Lvl 3 2'/2' Lvl 3.3  80/70 rpm 2'/2' Lvl 3.3  80/70 rpm 2'/2' Lvl 3 80/70 rpm   Gripper Held 2* post-op acuity  1' ea Red 100x Red 2' ea Green   Therabar U and N Held 2* post-op acuity  X20ea Red X20 Red X20 Green   Powerweb Held 2* post-op acuity  X20ea Red X50ea Red Erum Sprinkle           Ther Activity        Chop/Lift        Box Lift                                Modalities

## 2023-12-05 NOTE — LETTER
2023    Therese Dawkins MD   [address]  [address] [place] [facility].    Patient: Sridevi Middleton   YOB: 1950   Date of Visit: 2023     Encounter Diagnosis     ICD-10-CM    1. S/P carpal tunnel release  Z98.890       2. Carpal tunnel syndrome, bilateral  G56.03       3. Cervicalgia  M54.2           Dear Dr. Humza Johnson: Thank you for your recent referral of Sridevi Middleton. Please review the attached evaluation summary from Edgard's recent visit. Please verify that you agree with the plan of care by signing the attached order. If you have any questions or concerns, please do not hesitate to call. I sincerely appreciate the opportunity to share in the care of one of your patients and hope to have another opportunity to work with you in the near future. Sincerely,    Osman Roper, PT      Referring Provider:      I certify that I have read the below Plan of Care and certify the need for these services furnished under this plan of treatment while under my care. Therese Dawkins MD   [address]  [address] [address]  Via Fax: [phone]          PT Re-Evaluation     Today's date: 2023  Patient name: Sridevi Middleton  : 1950  MRN: [id number]  Referring provider: Therese Dawkins MD  Dx:   Encounter Diagnosis     ICD-10-CM    1. S/P carpal tunnel release  Z98.890       2. Carpal tunnel syndrome, bilateral  G56.03       3. Cervicalgia  M54.2           Start Time: 1080  Stop Time: 6596  Total time in clinic (min): 50 minutes    Assessment  Assessment details: Patient is a 74 y.o. male with medical diagnosis of bilateral carpal tunnel syndrome and cervicalgia. He is s/p R carpal tunnel release on 23. He demonstrates improved wrist extension and flexion ROM since surgery compared to left side. Held strength testing of R hand today secondary to post-op acuity.  Will plan to integrate strengthening next week, as that will be 3 weeks post-op. Focused on education of AROM and stretching exercises this visit for R hand. Reviewed technique for tendon gliding exercises. He is progressing towards his LTGs, but they are not met at this time. Patient will continue to benefit from skilled PT interventions to address remaining impairments and functional limitations. Impairments: abnormal or restricted ROM, abnormal movement, activity intolerance, impaired physical strength, lacks appropriate home exercise program, pain with function, scapular dyskinesis and poor posture   Functional limitations: opening containers, play piano/organ, bike  Goals  STG (3 weeks): Increase cervical extension by 5 degrees. Met. Able to perform scap TB row c/ proper form and use of scapular stabilizers. Met. Able to perform chin tucks in seated s/ assistance. Met. Full fist ROM. Met. LTG (6 weeks): Progressing. Cervical ROM WNL.  Strength = and WNL b/l.   75% improvement in b/l wrist and hand pain and N/T. Patient will be independent with HEP in 6 weeks to allow independent management of condition. Plan  Plan details: TE, NMR, TA, MT, self-care, and modalities as needed in order to progress through skilled PT focused on ROM, strength, balance, motor control. Patient would benefit from: skilled physical therapy  Planned modality interventions: cryotherapy and thermotherapy: hydrocollator packs  Planned therapy interventions: manual therapy, neuromuscular re-education, patient education, self care, therapeutic activities, therapeutic exercise and home exercise program  Frequency: 2x week  Duration in weeks: 6  Plan of Care beginning date: 12/5/2023  Plan of Care expiration date: 1/16/2024  Treatment plan discussed with: patient        Subjective Evaluation    History of Present Illness  Mechanism of injury: IE: Patient is a 74 y.o. male presenting with bilateral hand N/T.  Current symptoms started in left shoulder, progressed to right shoulder, then progressed to b/l hands and has intensified since specifically in August and 2023. Symptoms worsen c/ sedentary behavior. Advil and tylenol improve symptoms slightly. Has had x-rays of both left & right shoulder and neck. (-) for fracture. Wears wrist braces b/l at night which helps c/ pain moderately, improves swelling as well. Has been completing several hand squeezing exercises c/ ball. Ices wrist regularly, c/ moderate temporary effect on pain. Bikes approx 50 miles per week. Plays piano and organs. Gets a weekly 90 min full body massage. Patients primary goals for PT are to be able to piano and organ and have no symptoms in b/l hands. UPDATE RE : Patient reports >50% improvement in b/l hand symptoms since beginning PT. % improvements gets as high at 70% immediately following PT. Reports he is able to do more activities after PT sessions, but only lasts until he goes to bed. Noting progress in ability to make a fist and region of symptoms. Continues to get pain in b/l wrist, but reports improvements in the numbness and tingling of b/l hands. UPDATE RE : Patient presents to PT s/p R carpal tunnel release on 23. Is awaiting scheduling for procedure on opposite side. Reports less swelling and more mobility in R hand since surgery. Numbness in bilateral tips of fingers feels like sand. L hand is still bothersome and painful. Does not currently have a f/u c/ hand surgeon. Patient Goals  Patient goals for therapy: increased strength, decreased pain, increased motion and return to sport/leisure activities    Pain  Current pain rating: 3  At best pain ratin  At worst pain ratin      Diagnostic Tests  X-ray: abnormal (disc height loss and endplate and facet joint degenerative change at C5-6 and C6-7.)  Treatments  Previous treatment: medication        Objective    Observation:     -Posture:  Forward head, moderate thoracic kyphosis    Neuro Screen:  Edwards's: (-)  UE Clonus: (-)  DTRs:      -C5 (Biceps): 1+/2+     -C6 (Brachioradialis): 1+/1+     -C7 (Triceps): 2+/2+    Cervical ROM (degrees):    IE  RE (11/14)  FLX:  50  65  EXT:  35  40  SB (R/L): 30/20  30/25  ROT (R/L):  40/40  40/45       Strength (Trial 1,2,3): Strength NT 12/5 secondary to post-op acuity of R hand, will plan to assess next week, once 3 weeks post-op. IE  RE (11/14) RE (12/5)  Right:  25/25/15 20/25/25 NT  Left: 15/20/20 20/20/20 NT    Deep Cervical Flexor Endurance: Unable to maintain for >5". Thoracic Mobility: Significant limitation in rotation b/l and extension. Scapular Stability: Difficulty achieving scap retraction s/ tactile cues. Cervical Rotation Test: (-)    Wrist ROM (R/L):   IE  EXT: 57/46  FLX: 60/53    Wrist Strength (R/L): R NT secondary to post-op acuity, will test after 3 weeks post-op   RE  EXT:  FLX:    Opposition: Bilateral able to touch digit 5 to 1 tip to tip. Fist: Makes a full fist on R side. L limited at end range.  Unable to make full fist.          Precautions: None    Daily Treatment Diary:      Initial Evaluation Date: 10/10/23  Re-evaluation: 11/14/23, 12/5/23  POC Expiration: 1/16/24  Compliance 12/5 10/12 10/16  10/19  10/23  10/31  11/2  11/6  11/9  11/14   Visit Number 11 2 3  4  5  6  7  8  9 10   Re-Eval                  RE   Ali Bullion       Date 12/5 11/2 11/6 11/9 11/14   Manuals        Manual Traction  KS KS KS C/S KS   SOR        Closing Mob        T/S Ext     KS AAROM   B/L STM & PROM to Hands KS Retrograde + finger flexion KS Retrograde +finger flexion BUE KS Retrograde +finger flexion BUE KS Retrograde + finger flexion BUE KS PROM to hands   Assessment     KS   Neuro Re-Ed                                        Ther Ex        Seated TS Extension 10"/10 5"/10 10"/10 HEP 10"/10   Seated TS Rotation c/ C/S Fixed  x20ea x20ea HEP    Standing FLX/Scaption        Tendon Glides x20ea + Education x10ea x10 x10    Wrist AROM: F/E, RD/UD 2/10ea       Wrst Flexor Stretch 10"/10       UBE 2'/2' Lvl 3 80/70 rpm 2'/2' Lvl 3 2'/2' Lvl 3.3  80/70 rpm 2'/2' Lvl 3.3  80/70 rpm 2'/2' Lvl 3 80/70 rpm   Gripper Held 2* post-op acuity  1' ea Red 100x Red 2' ea Green   Therabar U and N Held 2* post-op acuity  X20ea Red X20 Red X20 Green   Powerweb Held 2* post-op acuity  X20ea Red X50ea Red Argelia Enrique           Ther Activity        Chop/Lift        Box Lift                                Modalities

## 2023-12-07 ENCOUNTER — OFFICE VISIT (OUTPATIENT)
Dept: PHYSICAL THERAPY | Facility: CLINIC | Age: 73
End: 2023-12-07
Payer: MEDICARE

## 2023-12-07 DIAGNOSIS — G56.03 CARPAL TUNNEL SYNDROME, BILATERAL: ICD-10-CM

## 2023-12-07 DIAGNOSIS — Z98.890 S/P CARPAL TUNNEL RELEASE: Primary | ICD-10-CM

## 2023-12-07 DIAGNOSIS — M54.2 CERVICALGIA: ICD-10-CM

## 2023-12-07 PROCEDURE — 97110 THERAPEUTIC EXERCISES: CPT

## 2023-12-07 PROCEDURE — 97140 MANUAL THERAPY 1/> REGIONS: CPT

## 2023-12-07 NOTE — PROGRESS NOTES
Daily Note     Today's date: 2023  Patient name: Colleen Orozco  : 1950  MRN: [id number]  Referring provider: Maureen Jaramillo MD  Dx:   Encounter Diagnosis     ICD-10-CM    1. S/P carpal tunnel release  Z98.890       2. Carpal tunnel syndrome, bilateral  G56.03       3. Cervicalgia  M54.2           Start Time: 1020  Stop Time: 1100  Total time in clinic (min): 40 minutes    Subjective: Reports he still has stiffness in the morning and pain c/ playing piano, though the intensity is lessened. Objective: See treatment diary below      Assessment: Tolerated treatment well. Patient demonstrated fatigue post treatment, exhibited good technique with therapeutic exercises, and would benefit from continued PT. Able to make fist on R side prior to [facility], but mobility improved c/ manual therapy interventions. L side ROM still considerably more limited than on surgical side, but improved c/ MT techniques. Plan: Continue per plan of care.       Precautions: None    Daily Treatment Diary:      Initial Evaluation Date: 10/10/23  Re-evaluation: 23, 23  POC Expiration: 24  Compliance 12/5 12/7 10/16  10/19  10/23  10/31  11/2  11/6  11/9  11/14   Visit Number 11 12 3  4  5  6  7  8  9 10   Re-Eval                  RE   Orien Leghorn       Date    Manuals        Manual Traction   KS KS C/S KS   SOR        Closing Mob        T/S Ext     KS AAROM   B/L STM & PROM to Hands KS Retrograde + finger flexion KS Retrograde +finger flexion BUE KS Retrograde +finger flexion BUE KS Retrograde + finger flexion BUE KS PROM to hands   Assessment     KS   Neuro Re-Ed                                        Ther Ex        Seated TS Extension 10"/10  10"/10 HEP 10"/10   Seated TS Rotation c/ C/S Fixed   x20ea HEP    Standing FLX/Scaption        Tendon Glides x20ea + Education x20ea x10 x10    Wrist AROM: F/E, RD/UD 2/10ea 2/10ea      Wrst Flexor Stretch 10"/10 10"/10 UBE 2'/2' Lvl 3 80/70 rpm  2'/2' Lvl 3.3  80/70 rpm 2'/2' Lvl 3.3  80/70 rpm 2'/2' Lvl 3 80/70 rpm   Gripper Held 2* post-op acuity  1' ea Red 100x Red 2' ea Green   Therabar U and N Held 2* post-op acuity  X20ea Red X20 Red X20 Green   Powerweb Held 2* post-op acuity  X20ea Red X50ea Red [facility]           Ther Activity        Chop/Lift        Box Lift                                Modalities

## 2023-12-12 ENCOUNTER — OFFICE VISIT (OUTPATIENT)
Dept: PHYSICAL THERAPY | Facility: CLINIC | Age: 73
End: 2023-12-12
Payer: MEDICARE

## 2023-12-12 DIAGNOSIS — G56.03 CARPAL TUNNEL SYNDROME, BILATERAL: ICD-10-CM

## 2023-12-12 DIAGNOSIS — Z98.890 S/P CARPAL TUNNEL RELEASE: Primary | ICD-10-CM

## 2023-12-12 DIAGNOSIS — M54.2 CERVICALGIA: ICD-10-CM

## 2023-12-12 PROCEDURE — 97140 MANUAL THERAPY 1/> REGIONS: CPT

## 2023-12-12 PROCEDURE — 97110 THERAPEUTIC EXERCISES: CPT

## 2023-12-12 NOTE — PROGRESS NOTES
Daily Note     Today's date: 2023  Patient name: Rhianna Duval  : 1950  MRN: [id number]  Referring provider: Kailash Howell MD  Dx:   Encounter Diagnosis     ICD-10-CM    1. S/P carpal tunnel release  Z98.890       2. Carpal tunnel syndrome, bilateral  G56.03       3. Cervicalgia  M54.2           Start Time:   Stop Time: 7935  Total time in clinic (min): 53 minutes    Subjective: Patient reports he "feels like he is getting his hands back"      Objective: See treatment diary below      Assessment: Tolerated treatment well. Patient demonstrated fatigue post treatment, exhibited good technique with therapeutic exercises, and would benefit from continued PT. Began to re-introduce strengthening post-op this visit. No difficulties, encouraged to avoid excess use of R hand at this point. Plan: Continue per plan of care.       Precautions: None    Daily Treatment Diary:      Initial Evaluation Date: 10/10/23  Re-evaluation: 23, 23  POC Expiration: 24  Compliance 12/5 12/7 12/12   10/23  10/31  11/2  11/6  11/9  11/14   Visit Number 11 12 13   5  6  7  8  9 10   Re-Eval                 RE   [facility]       Date    Manuals        Manual Traction    KS C/S KS   SOR        Closing Mob        T/S Ext     KS AAROM   B/L STM & PROM to Hands KS Retrograde + finger flexion KS Retrograde +finger flexion BUE KS Retrograde +finger flexion BUE KS Retrograde + finger flexion BUE KS PROM to hands   Assessment     KS   Neuro Re-Ed                                        Ther Ex        Seated TS Extension 10"/10  10"/10 HEP 10"/10   Seated TS Rotation c/ C/S Fixed    HEP    Standing FLX/Scaption        Tendon Glides x20ea + Education x20ea x20ea x10    Wrist AROM: F/E, RD/UD 2/10ea 2/10ea 2/10ea     Wrist Flexor Stretch 10"/10 10"/10 nv     UBE 2'/2' Lvl 3 80/70 rpm  2'/2' Lvl 3.3  80/70 rpm 2'/2' Lvl 3.3  80/70 rpm 2'/2' Lvl 3 80/70 rpm   Gripper Held 2* post-op acuity  1' ea Red 100x Red 2' ea Green   Therabar U and N Held 2* post-op acuity  x20ea Red X20 Red X20 Green   Powerweb Held 2* post-op acuity  x20ea Red X50ea Red Franny Jackson           Ther Activity        Chop/Lift        Box Lift                                Modalities

## 2023-12-14 ENCOUNTER — OFFICE VISIT (OUTPATIENT)
Dept: PHYSICAL THERAPY | Facility: CLINIC | Age: 73
End: 2023-12-14
Payer: MEDICARE

## 2023-12-14 DIAGNOSIS — Z98.890 S/P CARPAL TUNNEL RELEASE: Primary | ICD-10-CM

## 2023-12-14 DIAGNOSIS — M54.2 CERVICALGIA: ICD-10-CM

## 2023-12-14 DIAGNOSIS — G56.03 CARPAL TUNNEL SYNDROME, BILATERAL: ICD-10-CM

## 2023-12-14 PROCEDURE — 97140 MANUAL THERAPY 1/> REGIONS: CPT

## 2023-12-14 PROCEDURE — 97110 THERAPEUTIC EXERCISES: CPT

## 2023-12-14 NOTE — PROGRESS NOTES
Daily Note     Today's date: 2023  Patient name: Colleen Orozco  : 1950  MRN: [id number]  Referring provider: Maureen Jaramillo MD  Dx:   Encounter Diagnosis     ICD-10-CM    1. S/P carpal tunnel release  Z98.890       2. Carpal tunnel syndrome, bilateral  G56.03       3. Cervicalgia  M54.2           Start Time: 794  Stop Time:   Total time in clinic (min): 40 minutes    Subjective: Patient reports he is feeling more confident in his hands, has better tip to tip  on R hand to help with tasks, though still struggles to open the applesauce jar. Objective: See treatment diary below      Assessment: Tolerated treatment well. Patient demonstrated fatigue post treatment, exhibited good technique with therapeutic exercises, and would benefit from continued PT. Progressed resistance exercises to promote additional wrist and hand stability and improve functional jar opening at home. Plan: Continue per plan of care.       Precautions: None    Daily Treatment Diary:      Initial Evaluation Date: 10/10/23  Re-evaluation: 23, 23  POC Expiration: 24  Compliance 12/5 12/7 12/12 12/14  10/23  10/31  11/2  11/6  11/9  11/14   Visit Number 11 12 13 14  5  6  7  8  9 10   Re-Eval                 RE   Foto Captured                Y       Date    Manuals        Manual Traction     KS   SOR        Closing Mob        T/S Ext     KS AAROM   B/L STM & PROM to Hands KS Retrograde + finger flexion KS Retrograde +finger flexion BUE KS Retrograde +finger flexion BUE KS + finger flexion BUE KS PROM to hands   Assessment     KS   Neuro Re-Ed                                        Ther Ex        Seated TS Extension 10"/10  10"/10 DC 10"/10   Seated TS Rotation c/ C/S Fixed        Standing FLX/Scaption        Tendon Glides x20ea + Education x20ea x20ea x20ea    Wrist AROM: F/E, RD/UD 2/10ea 2/10ea 2/10ea 1# DB 2/10ea    Wrist Flexor Stretch 10"/10 10"/10 nv 10"/10    UBE 2'/2' Lvl 3 80/70 rpm  2'/2' Lvl 3.3  80/70 rpm  2'/2' Lvl 3 80/70 rpm   Gripper Held 2* post-op acuity  1' ea Red 1' ea Red 2' ea Green   Therabar U and N Held 2* post-op acuity  x20ea Red X20ea Red X20 Green   Powerweb Held 2* post-op acuity  x20ea Red X20ea Red X30ea Green   Jar Opening CW/CCW c/ Powerweb    Red x10ea            Ther Activity        Chop/Lift        Box Lift                                Modalities

## 2023-12-18 ENCOUNTER — EVALUATION (OUTPATIENT)
Dept: PHYSICAL THERAPY | Facility: CLINIC | Age: 73
End: 2023-12-18
Payer: MEDICARE

## 2023-12-18 DIAGNOSIS — G56.03 CARPAL TUNNEL SYNDROME, BILATERAL: ICD-10-CM

## 2023-12-18 DIAGNOSIS — Z98.890 S/P CARPAL TUNNEL RELEASE: Primary | ICD-10-CM

## 2023-12-18 DIAGNOSIS — M54.2 CERVICALGIA: ICD-10-CM

## 2023-12-18 PROCEDURE — 97110 THERAPEUTIC EXERCISES: CPT

## 2023-12-18 PROCEDURE — 97140 MANUAL THERAPY 1/> REGIONS: CPT

## 2023-12-18 NOTE — PROGRESS NOTES
PT Re-Evaluation     Today's date: 2023  Patient name: Edgard Green  : 1950  MRN: [id number]  Referring provider: Steven Storm MD  Dx:   Encounter Diagnosis     ICD-10-CM    1. S/P carpal tunnel release  Z98.890       2. Carpal tunnel syndrome, bilateral  G56.03       3. Cervicalgia  M54.2             Start Time: 1300  Stop Time: 1355  Total time in clinic (min): 55 minutes    Assessment  Assessment details:  Patient is a 73 y.o. male with medical diagnosis of bilateral carpal tunnel syndrome and cervicalgia. He is s/p R carpal tunnel release on 23. Since last re-evaluation, patient demonstrates considerable improvements in ability to actively make a fist and perform opposition even prior to manual theraoy interventions.  Functionally, he can now open a water bottle, though notes this is still tough due to weakness. He continues to require frequent verbal cues for technique c/ exercises t/o, but is progressing towards independence c/ HEP. At this point, he is most limited in forearm, wrist and hand muscle strength. Functionally, this limits his ability to open his applesauce jars and play piano. He is progressing towards his LTGs, but they are not met at this time. Patient will continue to benefit from skilled PT interventions to address remaining impairments and functional limitations.   Impairments: abnormal or restricted ROM, abnormal movement, activity intolerance, impaired physical strength, lacks appropriate home exercise program, pain with function, scapular dyskinesis and poor posture   Functional limitations: opening containers, play piano/organ, bike  Goals  STG (3 weeks):  Increase cervical extension by 5 degrees. Met.  Able to perform scap TB row c/ proper form and use of scapular stabilizers. Met.  Able to perform chin tucks in seated s/ assistance. Progressing.  Full fist ROM.Mt  et.    LTG (6 weeks): Progressing.  Cervical ROM WNL. Progressing.  75% improvement in b/l wrist  and hand pain and N/T. Partially met.  Wrist Strength >=4+/5. New 12/18.  Patient will be independent with HEP in 6 weeks to allow independent management of condition. Progressing.    Plan  Plan details: TE, NMR, TA, MT, self-care, and modalities as needed in order to progress through skilled PT focused on ROM, strength, balance, motor control.   Patient would benefit from: skilled physical therapy  Planned modality interventions: cryotherapy and thermotherapy: hydrocollator packs  Planned therapy interventions: manual therapy, neuromuscular re-education, patient education, self care, therapeutic activities, therapeutic exercise and home exercise program  Frequency: 2x week  Duration in weeks: 6  Plan of Care beginning date: 12/18/2023  Plan of Care expiration date: 1/29/2024  Treatment plan discussed with: patient        Subjective Evaluation    History of Present Illness  Mechanism of injury: IE: Patient is a 73 y.o. male presenting with bilateral hand N/T. Current symptoms started in left shoulder, progressed to right shoulder, then progressed to b/l hands and has intensified since specifically in August and September 2023. Symptoms worsen c/ sedentary behavior. Advil and tylenol improve symptoms slightly. Has had x-rays of both left & right shoulder and neck. (-) for fracture. Wears wrist braces b/l at night which helps c/ pain moderately, improves swelling as well. Has been completing several hand squeezing exercises c/ ball. Ices wrist regularly, c/ moderate temporary effect on pain. Bikes approx 50 miles per week. Plays piano and organs. Gets a weekly 90 min full body massage. Patients primary goals for PT are to be able to piano and organ and have no symptoms in b/l hands.      UPDATE RE 11/14: Patient reports >50% improvement in b/l hand symptoms since beginning PT. % improvements gets as high at 70% immediately following PT. Reports he is able to do more activities after PT sessions, but only lasts until  he goes to bed. Noting progress in ability to make a fist and region of symptoms. Continues to get pain in b/l wrist, but reports improvements in the numbness and tingling of b/l hands.     UPDATE RE : Patient presents to PT s/p R carpal tunnel release on 23. Is awaiting scheduling for procedure on opposite side. Reports less swelling and more mobility in R hand since surgery. Numbness in bilateral tips of fingers feels like sand. L hand is still bothersome and painful. Does not currently have a f/u c/ hand surgeon.     UPDATE : Mornings are the worst as he is stiff and takes awhile to get moving. R hand is doing well, reports pain in area of ulnar n. Has nearly resolved. Since procedure, L and R hand have improved. Reports over 85% improvement in swelling and ROM since beginning PT and surgery of R side.  strength is still an issue, about 70% improvement since beginning.   Patient Goals  Patient goals for therapy: increased strength, decreased pain, increased motion and return to sport/leisure activities    Pain  Current pain ratin  At best pain ratin  At worst pain rating: 3      Diagnostic Tests  X-ray: abnormal (disc height loss and endplate and facet joint degenerative change at C5-6 and C6-7.)  Treatments  Previous treatment: medication        Objective    Observation:     -Posture: Forward head, moderate thoracic kyphosis    Neuro Screen:  Edwards's: (-)  UE Clonus: (-)  DTRs:      -C5 (Biceps): 1+/2+     -C6 (Brachioradialis): 1+/1+     -C7 (Triceps): 2+/2+    Cervical ROM (degrees):    IE  RE ()  FLX:  50  65  EXT:  35  40  SB (R/L): 30/20  30/25  ROT (R/L):  40/40  40/45       Strength (Trial 1,2,3): Strength NT  secondary to post-op acuity of R hand, will plan to assess next week, once 3 weeks post-op.   IE  RE () RE () RE ()  Right:  25/25/15 20/25/25 NT  20/25/20  Left: 15/20/20 20/20/20 NT  20/20/20    Deep Cervical Flexor Endurance: Unable to  "maintain for >5\".    Thoracic Mobility: Significant limitation in rotation b/l and extension.    Scapular Stability: Difficulty achieving scap retraction s/ tactile cues.    Cervical Rotation Test: (-)    Wrist ROM (R/L):   RE (12/5) RE (12/18)   EXT:  57/46  57/46  FLX:  60/53  60/53    Wrist Strength (R/L): R NT 12/5 secondary to post-op acuity, will test after 3 weeks post-op   RE (12/18)  EXT: 4-/4-  FLX: 4-/4-    Opposition: Bilateral able to touch digit 5 to 1 tip to tip.     Fist: Just able to make a fist c/ bilateral hands.             Precautions: None    Daily Treatment Diary:        Initial Evaluation Date: 10/10/23  Re-evaluation: 11/14/23, 12/5/23, 12/18/23  POC Expiration: 1/29/24  Compliance 12/5 12/7 12/12 12/14  12/18  10/31  11/2  11/6  11/9  11/14   Visit Number 11 12 13 14  15  6  7  8  9 10   Re-Eval       RE          RE   Foto Captured                Y       Date 12/5 12/7 12/12 12/14 12/18   Manuals        Manual Traction     KS   SOR        Closing Mob        T/S Ext        B/L STM & PROM to Hands KS Retrograde + finger flexion KS Retrograde +finger flexion BUE KS Retrograde +finger flexion BUE KS + finger flexion BUE    Assessment     KS   Neuro Re-Ed                                        Ther Ex        Seated TS Extension 10\"/10  10\"/10 DC    Tendon Glides x20ea + Education x20ea x20ea x20ea x20ea   Wrist AROM: F/E, RD/UD 2/10ea 2/10ea 2/10ea 1# DB 2/10ea 1# DB 2/10ea   FA AROM: Sup/Pron     nv   Wrist Flexor Stretch 10\"/10 10\"/10 nv 10\"/10 10\"/10   UBE 2'/2' Lvl 3 80/70 rpm  2'/2' Lvl 3.3  80/70 rpm     Gripper Held 2* post-op acuity  1' ea Red 1' ea Red HEP   Therabar U and N Held 2* post-op acuity  x20ea Red X20ea Red X20ea Green   Powerweb Held 2* post-op acuity  x20ea Red X20ea Red X20ea Red   Jar Opening CW/CCW c/ Powerweb    Red x10ea Red x20ea           Ther Activity        Chop/Lift        Box Lift                                Modalities                              Access Code: " [email or web address]  URL: [email or web address]  Date: 10/10/2023  Prepared by: Beatriz Yuen    Exercises  - Mid-Lower Cervical Extension SNAG with Strap  - 1 x daily - 7 x weekly - 10 reps - 5-10 seconds hold  - Shoulder External Rotation and Scapular Retraction with Resistance  - 1 x daily - 7 x weekly - 15 reps - 5 seconds hold  - Standing Shoulder Row with Anchored Resistance  - 1 x daily - 7 x weekly - 2-3 sets - 10 reps  - Seated Cervical Retraction  - 1 x daily - 7 x weekly - 10 reps - 5 seconds hold

## 2023-12-18 NOTE — LETTER
2023    Steven Storm MD  [address]  [address]  [facility] [address]    Patient: Edgard Green   YOB: 1950   Date of Visit: 2023     Encounter Diagnosis     ICD-10-CM    1. S/P carpal tunnel release  Z98.890       2. Carpal tunnel syndrome, bilateral  G56.03       3. Cervicalgia  M54.2           Dear Dr. Storm:    Thank you for your recent referral of Edgard Green. Please review the attached evaluation summary from Edgard's recent visit.     Please verify that you agree with the plan of care by signing the attached order.     If you have any questions or concerns, please do not hesitate to call.     I sincerely appreciate the opportunity to share in the care of one of your patients and hope to have another opportunity to work with you in the near future.       Sincerely,    Beatriz Yuen, PT      Referring Provider:      I certify that I have read the below Plan of Care and certify the need for these services furnished under this plan of treatment while under my care.                    Steven Storm MD  [address]  [address]  [facility] [address]  Via Fax: [phone]          PT Re-Evaluation     Today's date: 2023  Patient name: Edgard Green  : 1950  MRN: [id number]  Referring provider: Steven Storm MD  Dx:   Encounter Diagnosis     ICD-10-CM    1. S/P carpal tunnel release  Z98.890       2. Carpal tunnel syndrome, bilateral  G56.03       3. Cervicalgia  M54.2             Start Time: 1300  Stop Time: 1355  Total time in clinic (min): 55 minutes    Assessment  Assessment details:  Patient is a 73 y.o. male with medical diagnosis of bilateral carpal tunnel syndrome and cervicalgia. He is s/p R carpal tunnel release on 23. Since last re-evaluation, patient demonstrates considerable improvements in ability to actively make a fist and perform opposition even prior to manual theraoy interventions.  Functionally, he can now open a water bottle,  though notes this is still tough due to weakness. He continues to require frequent verbal cues for technique c/ exercises t/o, but is progressing towards independence c/ HEP. At this point, he is most limited in forearm, wrist and hand muscle strength. Functionally, this limits his ability to open his applesauce jars and play piano. He is progressing towards his LTGs, but they are not met at this time. Patient will continue to benefit from skilled PT interventions to address remaining impairments and functional limitations.   Impairments: abnormal or restricted ROM, abnormal movement, activity intolerance, impaired physical strength, lacks appropriate home exercise program, pain with function, scapular dyskinesis and poor posture   Functional limitations: opening containers, play piano/organ, bike  Goals  STG (3 weeks):  Increase cervical extension by 5 degrees. Met.  Able to perform scap TB row c/ proper form and use of scapular stabilizers. Met.  Able to perform chin tucks in seated s/ assistance. Progressing.  Full fist ROM.Mt  et.    LTG (6 weeks): Progressing.  Cervical ROM WNL. Progressing.  75% improvement in b/l wrist and hand pain and N/T. Partially met.  Wrist Strength >=4+/5. New 12/18.  Patient will be independent with HEP in 6 weeks to allow independent management of condition. Progressing.    Plan  Plan details: TE, NMR, TA, MT, self-care, and modalities as needed in order to progress through skilled PT focused on ROM, strength, balance, motor control.   Patient would benefit from: skilled physical therapy  Planned modality interventions: cryotherapy and thermotherapy: hydrocollator packs  Planned therapy interventions: manual therapy, neuromuscular re-education, patient education, self care, therapeutic activities, therapeutic exercise and home exercise program  Frequency: 2x week  Duration in weeks: 6  Plan of Care beginning date: 12/18/2023  Plan of Care expiration date: 1/29/2024  Treatment plan  discussed with: patient        Subjective Evaluation    History of Present Illness  Mechanism of injury: IE: Patient is a 73 y.o. male presenting with bilateral hand N/T. Current symptoms started in left shoulder, progressed to right shoulder, then progressed to b/l hands and has intensified since specifically in August and September 2023. Symptoms worsen c/ sedentary behavior. Advil and tylenol improve symptoms slightly. Has had x-rays of both left & right shoulder and neck. (-) for fracture. Wears wrist braces b/l at night which helps c/ pain moderately, improves swelling as well. Has been completing several hand squeezing exercises c/ ball. Ices wrist regularly, c/ moderate temporary effect on pain. Bikes approx 50 miles per week. Plays piano and organs. Gets a weekly 90 min full body massage. Patients primary goals for PT are to be able to piano and organ and have no symptoms in b/l hands.      UPDATE RE 11/14: Patient reports >50% improvement in b/l hand symptoms since beginning PT. % improvements gets as high at 70% immediately following PT. Reports he is able to do more activities after PT sessions, but only lasts until he goes to bed. Noting progress in ability to make a fist and region of symptoms. Continues to get pain in b/l wrist, but reports improvements in the numbness and tingling of b/l hands.     UPDATE RE 12/5: Patient presents to PT s/p R carpal tunnel release on 11/20/23. Is awaiting scheduling for procedure on opposite side. Reports less swelling and more mobility in R hand since surgery. Numbness in bilateral tips of fingers feels like sand. L hand is still bothersome and painful. Does not currently have a f/u c/ hand surgeon.     UPDATE 12/18: Mornings are the worst as he is stiff and takes awhile to get moving. R hand is doing well, reports pain in area of ulnar n. Has nearly resolved. Since procedure, L and R hand have improved. Reports over 85% improvement in swelling and ROM since  "beginning PT and surgery of R side.  strength is still an issue, about 70% improvement since beginning.   Patient Goals  Patient goals for therapy: increased strength, decreased pain, increased motion and return to sport/leisure activities    Pain  Current pain ratin  At best pain ratin  At worst pain rating: 3      Diagnostic Tests  X-ray: abnormal (disc height loss and endplate and facet joint degenerative change at C5-6 and C6-7.)  Treatments  Previous treatment: medication        Objective    Observation:     -Posture: Forward head, moderate thoracic kyphosis    Neuro Screen:  Edwards's: (-)  UE Clonus: (-)  DTRs:      -C5 (Biceps): 1+/2+     -C6 (Brachioradialis): 1+/1+     -C7 (Triceps): 2+/2+    Cervical ROM (degrees):    IE  RE ()  FLX:  50  65  EXT:  35  40  SB (R/L): 30/20  30/25  ROT (R/L):  40/40  40/45       Strength (Trial 1,2,3): Strength NT 5 secondary to post-op acuity of R hand, will plan to assess next week, once 3 weeks post-op.   IE  RE () RE () RE ()  Right:  25/25/15 20/25/25 NT  20/25/20  Left: 15/20/20 20/20/20 NT  20/20/20    Deep Cervical Flexor Endurance: Unable to maintain for >5\".    Thoracic Mobility: Significant limitation in rotation b/l and extension.    Scapular Stability: Difficulty achieving scap retraction s/ tactile cues.    Cervical Rotation Test: (-)    Wrist ROM (R/L):   RE () RE ()   EXT:  57/46  57/46  FLX:  60/53  60/53    Wrist Strength (R/L): R NT 12/5 secondary to post-op acuity, will test after 3 weeks post-op   RE ()  EXT: 4-/4-  FLX: 4-/4-    Opposition: Bilateral able to touch digit 5 to 1 tip to tip.     Fist: Just able to make a fist c/ bilateral hands.             Precautions: None    Daily Treatment Diary:        Initial Evaluation Date: 10/10/23  Re-evaluation: 23, 23, 23  POC Expiration: 24  Compliance 12/5 12/7 12/12 12/14  12/18  10/31  11/2  11/6  11/9  11/14   Visit Number 11 12 13 " "14  15  6  7  8  9 10   Re-Eval       RE          RE   Foto Captured                Y       Date 12/5 12/7 12/12 12/14 12/18   Manuals        Manual Traction     KS   SOR        Closing Mob        T/S Ext        B/L STM & PROM to Hands KS Retrograde + finger flexion KS Retrograde +finger flexion BUE KS Retrograde +finger flexion BUE KS + finger flexion BUE    Assessment     KS   Neuro Re-Ed                                        Ther Ex        Seated TS Extension 10\"/10  10\"/10 DC    Tendon Glides x20ea + Education x20ea x20ea x20ea x20ea   Wrist AROM: F/E, RD/UD 2/10ea 2/10ea 2/10ea 1# DB 2/10ea 1# DB 2/10ea   FA AROM: Sup/Pron     nv   Wrist Flexor Stretch 10\"/10 10\"/10 nv 10\"/10 10\"/10   UBE 2'/2' Lvl 3 80/70 rpm  2'/2' Lvl 3.3  80/70 rpm     Gripper Held 2* post-op acuity  1' ea Red 1' ea Red HEP   Therabar U and N Held 2* post-op acuity  x20ea Red X20ea Red X20ea Green   Powerweb Held 2* post-op acuity  x20ea Red X20ea Red X20ea Red   Jar Opening CW/CCW c/ Powerweb    Red x10ea Red x20ea           Ther Activity        Chop/Lift        Box Lift                                Modalities                              Access Code: [email or web address]  URL: [email or web address]  Date: 10/10/2023  Prepared by: Beatriz Yuen    Exercises  - Mid-Lower Cervical Extension SNAG with Strap  - 1 x daily - 7 x weekly - 10 reps - 5-10 seconds hold  - Shoulder External Rotation and Scapular Retraction with Resistance  - 1 x daily - 7 x weekly - 15 reps - 5 seconds hold  - Standing Shoulder Row with Anchored Resistance  - 1 x daily - 7 x weekly - 2-3 sets - 10 reps  - Seated Cervical Retraction  - 1 x daily - 7 x weekly - 10 reps - 5 seconds hold                       "

## 2024-01-02 ENCOUNTER — OFFICE VISIT (OUTPATIENT)
Dept: PHYSICAL THERAPY | Facility: CLINIC | Age: 74
End: 2024-01-02
Payer: MEDICARE

## 2024-01-02 DIAGNOSIS — M54.2 CERVICALGIA: ICD-10-CM

## 2024-01-02 DIAGNOSIS — G56.03 CARPAL TUNNEL SYNDROME, BILATERAL: ICD-10-CM

## 2024-01-02 DIAGNOSIS — Z98.890 S/P CARPAL TUNNEL RELEASE: Primary | ICD-10-CM

## 2024-01-02 PROCEDURE — 97110 THERAPEUTIC EXERCISES: CPT

## 2024-01-02 PROCEDURE — 97140 MANUAL THERAPY 1/> REGIONS: CPT

## 2024-01-02 NOTE — PROGRESS NOTES
"Daily Note     Today's date: 2024  Patient name: Edgard Green  : 1950  MRN: [id number]  Referring provider: Steven Storm MD  Dx:   Encounter Diagnosis     ICD-10-CM    1. S/P carpal tunnel release  Z98.890       2. Carpal tunnel syndrome, bilateral  G56.03       3. Cervicalgia  M54.2           Start Time: 1345  Stop Time: 1440  Total time in clinic (min): 55 minutes    Subjective: Reports he is slowly getting his hands back. Is able to play piano for a time without pain. Still very stiff in the morning. Had to take a break from PT for CTS secondary to venaseal surgery.       Objective: See treatment diary below.  Strength: R 25#, L 20#      Assessment: Tolerated treatment well. Patient demonstrated fatigue post treatment, exhibited good technique with therapeutic exercises, and would benefit from continued PT. Continues to progress in wrist and forearm stability as evidenced by ability to increase resistance c/ TE today.       Plan: Continue per plan of care.      Precautions: None    Daily Treatment Diary:        Initial Evaluation Date: 10/10/23  Re-evaluation: 23, 23, 23  POC Expiration: 24  Compliance    Visit Number 11 12 13 14  15  16  7  8  9 10   Re-Eval       RE          RE   Foto Captured                Y       Date    Manuals        Manual Traction     KS   SOR        Closing Mob        T/S Ext        B/L STM & PROM to Hands KS +  Strength Assesment KS Retrograde +finger flexion BUE KS Retrograde +finger flexion BUE KS + finger flexion BUE    Assessment     KS   Neuro Re-Ed                                        Ther Ex        Seated TS Extension   10\"/10 DC    Tendon Glides HEP x20ea x20ea x20ea x20ea   Wrist AROM: F/E, RD/UD 2#  DB 2/10ea 2/10ea 2/10ea 1# DB 2/10ea 1# DB 2/10ea   FA AROM: Sup/Pron 2# DB 2/10ea    nv   Wrist Flexor Stretch 10\"/10 + EXT 10\"/10 nv 10\"/10 " "10\"/10   UBE   2'/2' Lvl 3.3  80/70 rpm     Gripper   1' ea Red 1' ea Red HEP   Therabar U and N X20ea Green  x20ea Red X20ea Red X20ea Green   Powerweb X20ea Red  x20ea Red X20ea Red X20ea Red   Jar Opening CW/CCW c/ Powerweb Red x20ea   Red x10ea Red x20ea           Ther Activity        Chop/Lift        Box Lift                                Modalities                              Access Code: [email or web address]  URL: [email or web address]  Date: 10/10/2023  Prepared by: Beatriz Yuen    Exercises  - Mid-Lower Cervical Extension SNAG with Strap  - 1 x daily - 7 x weekly - 10 reps - 5-10 seconds hold  - Shoulder External Rotation and Scapular Retraction with Resistance  - 1 x daily - 7 x weekly - 15 reps - 5 seconds hold  - Standing Shoulder Row with Anchored Resistance  - 1 x daily - 7 x weekly - 2-3 sets - 10 reps  - Seated Cervical Retraction  - 1 x daily - 7 x weekly - 10 reps - 5 seconds hold         "

## 2024-01-11 ENCOUNTER — OFFICE VISIT (OUTPATIENT)
Dept: PHYSICAL THERAPY | Facility: CLINIC | Age: 74
End: 2024-01-11
Payer: MEDICARE

## 2024-01-11 DIAGNOSIS — G56.03 CARPAL TUNNEL SYNDROME, BILATERAL: ICD-10-CM

## 2024-01-11 DIAGNOSIS — M54.2 CERVICALGIA: ICD-10-CM

## 2024-01-11 DIAGNOSIS — Z98.890 S/P CARPAL TUNNEL RELEASE: Primary | ICD-10-CM

## 2024-01-11 PROCEDURE — 97110 THERAPEUTIC EXERCISES: CPT

## 2024-01-11 PROCEDURE — 97140 MANUAL THERAPY 1/> REGIONS: CPT

## 2024-01-11 NOTE — PROGRESS NOTES
"Daily Note     Today's date: 2024  Patient name: Edgard Green  : 1950  MRN: [id number]  Referring provider: Steven Storm MD  Dx:   Encounter Diagnosis     ICD-10-CM    1. S/P carpal tunnel release  Z98.890       2. Carpal tunnel syndrome, bilateral  G56.03       3. Cervicalgia  M54.2           Start Time: 09  Stop Time: 1020  Total time in clinic (min): 50 minutes    Subjective: Reports he was prescribed a steroid for his pain in his Les, but that has improved his b/l wrists considerably. He took his last dose yesterday. Able to open his water bottle with his own hands now.       Objective: See treatment diary below.  Strength (R/L): 30/35, Wrist Extension (R/L): 70/51      Assessment: Tolerated treatment well. Patient demonstrated fatigue post treatment, exhibited good technique with therapeutic exercises, and would benefit from continued PT. Demonstrates considerable improvement in both wrist mobility and  strength. Progressed resistance c/ jar opening exercise. Educated patient regarding modifications for exercises at home.       Plan: Continue per plan of care.      Precautions: None    Daily Treatment Diary:        Initial Evaluation Date: 10/10/23  Re-evaluation: 23, 23, 23  POC Expiration: 24  Compliance    Visit Number 11 12 13 14  15  16  17  8  9 10   Re-Eval       RE          RE   Foto Captured                Y       Date    Manuals        Manual Traction  C/S KS   KS   SOR        Closing Mob        T/S Ext        B/L STM & PROM to Hands KS +  Strength Assesment KS ROM +  Strength Assessment KS Retrograde +finger flexion BUE KS + finger flexion BUE    Assessment     KS   Neuro Re-Ed                                        Ther Ex        Seated TS Extension   10\"/10 DC    Tendon Glides HEP  x20ea x20ea x20ea   Wrist AROM: F/E, RD/UD 2#  DB 2/10ea 2# DB 2/10ea 2/10ea " "1# DB 2/10ea 1# DB 2/10ea   FA AROM: Sup/Pron 2# DB 2/10ea 2# DB 2/10ea   nv   Wrist Flexor Stretch 10\"/10 + EXT 10\"/10 nv 10\"/10 10\"/10   UBE   2'/2' Lvl 3.3  80/70 rpm     Gripper   1' ea Red 1' ea Red HEP   Therabar U and N X20ea Green X50ea Green x20ea Red X20ea Red X20ea Green   Powerweb X20ea Red X20 Green x20ea Red X20ea Red X20ea Red   Jar Opening CW/CCW c/ Powerweb Red x20ea X20ea Green  Red x10ea Red x20ea           Ther Activity        Chop/Lift        Box Lift                                Modalities                              Access Code: [email or web address]  URL: [email or web address]  Date: 10/10/2023  Prepared by: Beatriz Yuen    Exercises  - Mid-Lower Cervical Extension SNAG with Strap  - 1 x daily - 7 x weekly - 10 reps - 5-10 seconds hold  - Shoulder External Rotation and Scapular Retraction with Resistance  - 1 x daily - 7 x weekly - 15 reps - 5 seconds hold  - Standing Shoulder Row with Anchored Resistance  - 1 x daily - 7 x weekly - 2-3 sets - 10 reps  - Seated Cervical Retraction  - 1 x daily - 7 x weekly - 10 reps - 5 seconds hold           "

## 2024-01-16 ENCOUNTER — OFFICE VISIT (OUTPATIENT)
Dept: PHYSICAL THERAPY | Facility: CLINIC | Age: 74
End: 2024-01-16
Payer: MEDICARE

## 2024-01-16 DIAGNOSIS — M54.2 CERVICALGIA: ICD-10-CM

## 2024-01-16 DIAGNOSIS — Z98.890 S/P CARPAL TUNNEL RELEASE: Primary | ICD-10-CM

## 2024-01-16 DIAGNOSIS — G56.03 CARPAL TUNNEL SYNDROME, BILATERAL: ICD-10-CM

## 2024-01-16 PROCEDURE — 97110 THERAPEUTIC EXERCISES: CPT

## 2024-01-16 PROCEDURE — 97140 MANUAL THERAPY 1/> REGIONS: CPT

## 2024-01-16 NOTE — PROGRESS NOTES
Daily Note     Today's date: 2024  Patient name: Edgard Green  : 1950  MRN: [id number]  Referring provider: Steven Storm MD  Dx:   Encounter Diagnosis     ICD-10-CM    1. S/P carpal tunnel release  Z98.890       2. Carpal tunnel syndrome, bilateral  G56.03       3. Cervicalgia  M54.2                      Subjective: Reports he is pleased c/ progross, hasn't lost much ground since stopping the steroid early last week. Is still able to open bottle s/ vice  and has fist ROM. Had a small flare-up of ulnar sided pain on R hand after using his computer mouse, but was able to manage the pain himself c/ HEP. Feels he is ready to transition to independent HEP to continue to progress towards goals and increase strength.       Objective: See treatment diary below. Wrist EXT (R/L): 70/51,  Strength (R/L): 30/30      Assessment: Tolerated treatment well. Patient demonstrated fatigue post treatment and exhibited good technique with therapeutic exercises. Reviewed all exercises and answered any pertinent questions regarding continuing c/ HEP. FOTO goal met and exceeded. Patient demonstrates independence c/ HEP and will benefit from transition to independent management c/ HEP to continue to progress towards his LTGs and increasing strength.       Plan:  D/c to independent management c/ HEP.     Precautions: None    Daily Treatment Diary:        Initial Evaluation Date: 10/10/23  Re-evaluation: 23, 23, 23  POC Expiration: 24  Compliance    Visit Number 11 12 13 14  15  16  17  18  9 10   Re-Eval       RE      DC    RE   Foto Captured                Y       Date      Manuals        Manual Traction  C/S KS C/S KS     SOR        Closing Mob        T/S Ext        B/L STM & PROM to Hands KS +  Strength Assesment KS ROM +  Strength Assessment KS ROM +  Strength Assessment     Assessment        Neuro Re-Ed    "                                     Ther Ex        Seated TS Extension        Tendon Glides HEP       Wrist AROM: F/E, RD/UD 2#  DB 2/10ea 2# DB 2/10ea 2# DB 2/10ea     FA AROM: Sup/Pron 2# DB 2/10ea 2# DB 2/10ea 2# DB 2/10ea     Wrist Flexor Stretch 10\"/10 + EXT 10\"/10 10\"/10     UBE        Gripper        Therabar U and N X20ea Green X50ea Green X50ea Green     Powerweb X20ea Red X20 Green X30 green     Jar Opening CW/CCW c/ Powerweb Red x20ea X20ea Green X30 grn              Ther Activity        Chop/Lift        Box Lift                                Modalities                              Access Code: [email or web address]  URL: [email or web address]  Date: 10/10/2023  Prepared by: Beatriz Yuen    Exercises  - Mid-Lower Cervical Extension SNAG with Strap  - 1 x daily - 7 x weekly - 10 reps - 5-10 seconds hold  - Shoulder External Rotation and Scapular Retraction with Resistance  - 1 x daily - 7 x weekly - 15 reps - 5 seconds hold  - Standing Shoulder Row with Anchored Resistance  - 1 x daily - 7 x weekly - 2-3 sets - 10 reps  - Seated Cervical Retraction  - 1 x daily - 7 x weekly - 10 reps - 5 seconds hold             "

## 2024-02-23 ENCOUNTER — OFFICE VISIT (OUTPATIENT)
Dept: URGENT CARE | Facility: CLINIC | Age: 74
End: 2024-02-23
Payer: MEDICARE

## 2024-02-23 VITALS
SYSTOLIC BLOOD PRESSURE: 172 MMHG | HEART RATE: 90 BPM | DIASTOLIC BLOOD PRESSURE: 90 MMHG | BODY MASS INDEX: 31.5 KG/M2 | TEMPERATURE: 97.1 F | HEIGHT: 70 IN | WEIGHT: 220 LBS | RESPIRATION RATE: 20 BRPM | OXYGEN SATURATION: 97 %

## 2024-02-23 DIAGNOSIS — J40 SINOBRONCHITIS: Primary | ICD-10-CM

## 2024-02-23 DIAGNOSIS — J32.9 SINOBRONCHITIS: Primary | ICD-10-CM

## 2024-02-23 PROCEDURE — G0463 HOSPITAL OUTPT CLINIC VISIT: HCPCS

## 2024-02-23 PROCEDURE — 99213 OFFICE O/P EST LOW 20 MIN: CPT

## 2024-02-23 RX ORDER — DOXYCYCLINE 100 MG/1
100 TABLET ORAL 2 TIMES DAILY
Qty: 14 TABLET | Refills: 0 | Status: SHIPPED | OUTPATIENT
Start: 2024-02-23 | End: 2024-03-01

## 2024-02-23 RX ORDER — BENZONATATE 100 MG/1
100 CAPSULE ORAL 3 TIMES DAILY PRN
Qty: 20 CAPSULE | Refills: 0 | Status: SHIPPED | OUTPATIENT
Start: 2024-02-23 | End: 2024-03-01

## 2024-02-23 NOTE — PROGRESS NOTES
[facility]        NAME: Edgard Green is a 73 y.o. male  : 1950    MRN: [id number]  DATE: 2024  TIME: 11:31 AM    Assessment and Plan   Sinobronchitis [J32.9, J40]  1. Sinobronchitis  doxycycline (ADOXA) 100 MG tablet    benzonatate (TESSALON PERLES) 100 mg capsule        Lungs clear on PE and VSS. Will treat with Doxycycline and Tessalon Perles PRN. Encouraged continued supportive measures.  Close follow up with PCP in 3-5 days or proceed to emergency department for worsening symptoms.  Patient verbalized understanding of instructions given.       Patient Instructions     Patient Instructions   Take antibiotic as prescribed  Continue with supportive measures, OTC Tylenol/Ibuprofen, nasal decongestants, and cough suppressants (Tessalon Perles)   Cool mist humidifiers, throat lozenges, increased fluid intake and rest   Follow up with PCP in 3-5 days  Present to ER if symptoms worsen     Acute Bronchitis   AMBULATORY CARE:   Acute bronchitis  is swelling and irritation in your lungs. It is usually caused by a virus and most often happens in the winter. Bronchitis may also be caused by bacteria or by a chemical irritant, such as smoke.  Common symptoms:   Cough that lasts up to 3 weeks    Runny or stuffy nose    Hoarseness, sore throat    Fever    Feeling more tired than usual, and body aches    Wheezing or pain when you breathe or cough    Seek care immediately if:   You cough up blood.    Your lips or fingernails turn blue.    You feel like you are not getting enough air when you breathe.    Call your doctor if:   Your symptoms do not go away or get worse, even after treatment.    Your cough does not get better within 4 weeks.    You have questions or concerns about your condition or care.    Medicines:  You may need any of the following:  Cough suppressants  decrease your urge to cough.    Decongestants  help loosen mucus in your lungs and make it easier to cough up. This can help  you breathe easier.    Inhalers  may be given. Your healthcare provider may give you one or more inhalers to help you breathe easier and cough less. An inhaler gives you medicine to open your airways. Ask your healthcare provider to show you how to use your inhaler correctly.         Antiviral medicine  treats infections caused by a virus.    Antibiotics  may be given if your bronchitis is caused by bacteria or if you have lung condition.    Acetaminophen  decreases pain and fever. It is available without a doctor's order. Ask how much to take and how often to take it. Follow directions. Read the labels of all other medicines you are using to see if they also contain acetaminophen, or ask your doctor or pharmacist. Acetaminophen can cause liver damage if not taken correctly.    NSAIDs  help decrease swelling and pain or fever. This medicine is available with or without a doctor's order. NSAIDs can cause stomach bleeding or kidney problems in certain people. If you take blood thinner medicine, always ask your healthcare provider if NSAIDs are safe for you. Always read the medicine label and follow directions.    Self-care:   Drink liquids as directed.  You may need to drink more liquids than usual to stay hydrated. Ask how much liquid to drink each day and which liquids are best for you.    Use a cool mist humidifier.  This increases air moisture in your home. This may make it easier for you to breathe and help decrease your cough.     Get more rest.  Rest helps your body to heal. Slowly start to do more each day. Rest when you feel it is needed.    Prevent acute bronchitis:       Ask about vaccines you may need.  Get a flu vaccine each year as soon as recommended, usually in September or October. Ask your healthcare provider if you should also get a pneumonia or COVID-19 vaccine. Your healthcare provider can tell you if you should also get other vaccines, and when to get them.    Prevent the spread of germs.  You  can decrease your risk for acute bronchitis and other illnesses by doing the following:    Wash your hands often with soap and water. Carry germ-killing hand lotion or gel with you. You can use the lotion or gel to clean your hands when soap and water are not available.         Do not touch your eyes, nose, or mouth unless you have washed your hands first.    Always cover your mouth when you cough to prevent the spread of germs. It is best to cough into a tissue or your shirt sleeve instead of into your hand. Ask those around you to cover their mouths when they cough.    Try to avoid people who have a cold or the flu. If you are sick, stay away from others as much as possible.    Avoid irritants in the air.  Avoid chemicals, fumes, and dust. Wear a face mask if you must work around dust or fumes. Stay inside on days when air pollution levels are high. If you have allergies, stay inside when pollen counts are high. Do not use aerosol products, such as spray-on deodorant, bug spray, and hair spray.    Do not smoke or be around others who are smoking.  Nicotine and other chemicals in cigarettes and cigars can cause lung damage. Ask your healthcare provider for information if you currently smoke and need help to quit. E-cigarettes or smokeless tobacco still contain nicotine. Talk to your healthcare provider before you use these products.  Follow up with your doctor as directed:  Write down questions you have so you will remember to ask them during your follow-up visits.  © Copyright [employer] 2023 Information is for End User's use only and may not be sold, redistributed or otherwise used for commercial purposes.  The above information is an  only. It is not intended as medical advice for individual conditions or treatments. Talk to your doctor, nurse or pharmacist before following any medical regimen to see if it is safe and effective for you.        Chief Complaint     Chief Complaint   Patient presents  with    Nasal Congestion    Nausea         History of Present Illness       73-year-old male with no significant past medical history presents with complaints of ongoing nasal congestion, postnasal drip, and cough x 3 weeks.  Denies fever, vomiting, or diarrhea.  No known sick contacts or exposures.  Denies shortness of breath or wheezing.  Has completed course of azithromycin with little relief.  Also taking OTC medications.        Review of Systems   Review of Systems   Constitutional:  Positive for chills. Negative for fever.   HENT:  Positive for congestion, postnasal drip and rhinorrhea. Negative for ear discharge, ear pain, sore throat, trouble swallowing and voice change.    Eyes:  Negative for discharge.   Respiratory:  Positive for cough. Negative for shortness of breath and wheezing.    Cardiovascular:  Negative for chest pain.   Gastrointestinal:  Positive for nausea. Negative for abdominal pain, diarrhea and vomiting.   Skin:  Negative for rash.         Current Medications       Current Outpatient Medications:     acetaminophen (TYLENOL) 500 mg tablet, Take 500 mg by mouth every 6 (six) hours, Disp: , Rfl:     ALPRAZolam (XANAX) 0.5 mg tablet, Take 0.5 mg by mouth 2 (two) times a day, Disp: , Rfl:     benzonatate (TESSALON PERLES) 100 mg capsule, Take 1 capsule (100 mg total) by mouth 3 (three) times a day as needed for cough for up to 7 days, Disp: 20 capsule, Rfl: 0    Bioflavonoid Products (EVANS C PO), Take by mouth, Disp: , Rfl:     Cholecalciferol 25 MCG (1000 UT) tablet, Take by mouth, Disp: , Rfl:     cyanocobalamin (VITAMIN B-12) 1000 MCG tablet, Take 1,000 mcg by mouth, Disp: , Rfl:     doxycycline (ADOXA) 100 MG tablet, Take 1 tablet (100 mg total) by mouth 2 (two) times a day for 7 days, Disp: 14 tablet, Rfl: 0    esomeprazole (NexIUM) 40 MG capsule, Take 40 mg by mouth, Disp: , Rfl:     furosemide (LASIX) 20 mg tablet, Take 20 mg by mouth, Disp: , Rfl:     Klor-Con M20 20 MEQ tablet, Take 20  "mEq by mouth daily, Disp: , Rfl:     losartan (COZAAR) 100 MG tablet, , Disp: , Rfl:     Nexletol 180 MG TABS, Take 1 tablet by mouth daily, Disp: , Rfl:     rivaroxaban (XARELTO) 10 mg tablet, Take 20 mg by mouth, Disp: , Rfl:     Multiple Vitamins-Minerals (PRESERVISION AREDS 2 PO), Take by mouth (Patient not taking: Reported on 4/5/2023), Disp: , Rfl:     Current Allergies     Allergies as of 02/23/2024 - Reviewed 02/23/2024   Allergen Reaction Noted    Erythromycin GI Intolerance 07/24/2018    Penicillins Hives 07/24/2018    Statins Myalgia 11/28/2016            The following portions of the patient's history were reviewed and updated as appropriate: allergies, current medications, past family history, past medical history, past social history, past surgical history and problem list.     Past Medical History:   Diagnosis Date    Anxiety     Clotting disorder (HCC)     GERD (gastroesophageal reflux disease)     Night sweats     Urinary tract infection        Past Surgical History:   Procedure Laterality Date    JOINT REPLACEMENT         Family History   Problem Relation Age of Onset    Alzheimer's disease Father     Breast cancer Mother     Cancer Maternal Uncle          Medications have been verified.        Objective   BP (!) 172/90   Pulse 90   Temp (!) 97.1 °F (36.2 °C)   Resp 20   Ht 5' 10\" (1.778 m)   Wt 99.8 kg (220 lb)   SpO2 97%   BMI 31.57 kg/m²   No LMP for male patient.       Physical Exam     Physical Exam  Vitals and nursing note reviewed.   Constitutional:       General: He is not in acute distress.     Appearance: He is not toxic-appearing.   HENT:      Head: Normocephalic.      Right Ear: Tympanic membrane, ear canal and external ear normal.      Left Ear: Tympanic membrane, ear canal and external ear normal.      Nose: Congestion present.      Right Sinus: No maxillary sinus tenderness or frontal sinus tenderness.      Left Sinus: No maxillary sinus tenderness or frontal sinus tenderness. "      Mouth/Throat:      Mouth: Mucous membranes are moist.      Pharynx: Posterior oropharyngeal erythema present.      Comments: Mucoid drainage in posterior pharynx   Eyes:      Conjunctiva/sclera: Conjunctivae normal.   Cardiovascular:      Rate and Rhythm: Normal rate and regular rhythm.      Heart sounds: Normal heart sounds.   Pulmonary:      Effort: Pulmonary effort is normal. No respiratory distress.      Breath sounds: Normal breath sounds. No stridor. No wheezing, rhonchi or rales.   Lymphadenopathy:      Cervical: No cervical adenopathy.   Skin:     General: Skin is warm and dry.   Neurological:      Mental Status: He is alert and oriented to person, place, and time.      Gait: Gait is intact.   Psychiatric:         Mood and Affect: Mood normal.         Behavior: Behavior normal.

## 2024-03-24 PROBLEM — Z87.448 HISTORY OF HEMATURIA: Status: ACTIVE | Noted: 2024-03-24

## 2024-03-24 NOTE — PROGRESS NOTES
UROLOGY PROGRESS NOTE         NAME: Edgard Green  AGE: 73 y.o. SEX: male  : 1950   MRN: [id number]    DATE: 4/3/2024  TIME: 11:10 AM    Assessment and Plan   Procedures     Impression:   1. Family history of bladder cancer    2. History of elevated PSA    3. History of hematuria    4. Elevated PSA         Plan: Patient's urinalysis completely normal no blood.    Patient I had a long discussion regarding the chronically elevated PSAs knees elected for an MP MRI biopsy.  We will loosen this up in the near future.    Regarding his right inguinal hernia which is a new finding on physical exam it is nonbothersome patient wants to observation and I agree.  I have spent a total time of 30 minutes minutes on 24 in caring for this patient including discussing possible results of MP MRI Counseling / Coordination of care, Reviewing / ordering tests, medicine, procedures  , and Obtaining or reviewing history  .     Chief Complaint   No chief complaint on file.    History of Present Illness     HPI: Edgard Green is a 73 y.o. year old male who presents with follow-up from procedure visit on 2023.  Patient with a history of acute cystitis and microhematuria, family history of bladder cancer, and history of elevated PSA.  Cystoscopy last year showed an enlarged prostate and a trabeculated bladder otherwise normal.    Had a history of elevated PSA in 2018 at 4.8 not require prostate biopsy from his previous urologist.  Patient was to get PSA on 2023 but never got it.  Also with a history of ED currently not on medication nonbothersome per last year's note.    PSA now 5.49 on 3/25/2024    Discussed with the patient regarding the change in PSA.  We discussed observation, biopsy or MP MRI he selected for imaging tests because this been chronic.    Currently not having any irritable or obstructive voiding complaints no hematuria.    The following portions of the patient's history were reviewed and updated as  appropriate: allergies, current medications, past family history, past medical history, past social history, past surgical history and problem list.  Past Medical History:   Diagnosis Date    Anxiety     Clotting disorder (HCC)     GERD (gastroesophageal reflux disease)     Night sweats     Urinary tract infection      Past Surgical History:   Procedure Laterality Date    JOINT REPLACEMENT       shoulder  Review of Systems     Const: Denies chills, fever and weight loss.  CV: Denies chest pain.  Resp: Denies SOB.  GI: Denies abdominal pain, nausea and vomiting.  : Denies symptoms other than stated above.  Musculo: Denies back pain.    Objective   There were no vitals taken for this visit.    Physical Exam  Const: Appears healthy and well developed. No signs of acute distress present.  Resp: Respirations are regular and unlabored.   CV: Rate is regular. Rhythm is regular.  Abdomen: Abdomen is soft, nontender, and nondistended. Kidneys are not palpable.  : bph on exam, reducible right inguinal hernia.  Psych: Patient's attitude is cooperative. Mood is normal. Affect is normal.    Current Medications     Current Outpatient Medications:     azithromycin (ZITHROMAX) 250 mg tablet, TAKE 2 TABLETS BY MOUTH TODAY, THEN TAKE 1 TABLET DAILY FOR 4 DAYS AS DIRECTED, Disp: , Rfl:     methylPREDNISolone 4 MG tablet therapy pack, TAKE 6 TABLETS ON DAY 1 AS DIRECTED ON PACKAGE AND DECREASE BY 1 TAB EACH DAY FOR A TOTAL OF 6 DAYS, Disp: , Rfl:     acetaminophen (TYLENOL) 500 mg tablet, Take 500 mg by mouth every 6 (six) hours, Disp: , Rfl:     ALPRAZolam (XANAX) 0.5 mg tablet, Take 0.5 mg by mouth 2 (two) times a day, Disp: , Rfl:     Bioflavonoid Products (EVANS C PO), Take by mouth, Disp: , Rfl:     Cholecalciferol 25 MCG (1000 UT) tablet, Take by mouth, Disp: , Rfl:     cyanocobalamin (VITAMIN B-12) 1000 MCG tablet, Take 1,000 mcg by mouth, Disp: , Rfl:     esomeprazole (NexIUM) 40 MG capsule, Take 40 mg by mouth, Disp: ,  Rfl:     furosemide (LASIX) 20 mg tablet, Take 20 mg by mouth, Disp: , Rfl:     Klor-Con M20 20 MEQ tablet, Take 20 mEq by mouth daily, Disp: , Rfl:     losartan (COZAAR) 100 MG tablet, , Disp: , Rfl:     meloxicam (MOBIC) 7.5 mg tablet, , Disp: , Rfl:     Multiple Vitamins-Minerals (PRESERVISION AREDS 2 PO), Take by mouth (Patient not taking: Reported on 4/5/2023), Disp: , Rfl:     Nexletol 180 MG TABS, Take 1 tablet by mouth daily, Disp: , Rfl:     rivaroxaban (XARELTO) 10 mg tablet, Take 20 mg by mouth, Disp: , Rfl:         Eric Matos MD

## 2024-03-25 ENCOUNTER — TELEPHONE (OUTPATIENT)
Dept: UROLOGY | Facility: CLINIC | Age: 74
End: 2024-03-25

## 2024-03-25 ENCOUNTER — APPOINTMENT (OUTPATIENT)
Dept: LAB | Facility: HOSPITAL | Age: 74
End: 2024-03-25
Payer: MEDICARE

## 2024-03-25 DIAGNOSIS — R35.1 NOCTURIA: Primary | ICD-10-CM

## 2024-03-25 DIAGNOSIS — R35.1 NOCTURIA: ICD-10-CM

## 2024-03-25 LAB — PSA SERPL-MCNC: 5.49 NG/ML (ref 0–4)

## 2024-03-25 PROCEDURE — 36415 COLL VENOUS BLD VENIPUNCTURE: CPT

## 2024-03-25 PROCEDURE — 84153 ASSAY OF PSA TOTAL: CPT

## 2024-03-29 RX ORDER — METHYLPREDNISOLONE 4 MG/1
TABLET ORAL
COMMUNITY
Start: 2024-01-04 | End: 2024-04-03

## 2024-03-29 RX ORDER — MELOXICAM 7.5 MG/1
TABLET ORAL
COMMUNITY
End: 2024-04-03

## 2024-03-29 RX ORDER — AZITHROMYCIN 250 MG/1
TABLET, FILM COATED ORAL
COMMUNITY
Start: 2024-02-08 | End: 2024-04-03

## 2024-04-03 ENCOUNTER — OFFICE VISIT (OUTPATIENT)
Dept: UROLOGY | Facility: CLINIC | Age: 74
End: 2024-04-03
Payer: MEDICARE

## 2024-04-03 VITALS
OXYGEN SATURATION: 97 % | BODY MASS INDEX: 33.98 KG/M2 | HEIGHT: 69 IN | HEART RATE: 70 BPM | TEMPERATURE: 98.2 F | SYSTOLIC BLOOD PRESSURE: 160 MMHG | DIASTOLIC BLOOD PRESSURE: 84 MMHG | WEIGHT: 229.4 LBS

## 2024-04-03 DIAGNOSIS — Z80.52 FAMILY HISTORY OF BLADDER CANCER: Primary | ICD-10-CM

## 2024-04-03 DIAGNOSIS — Z87.898 HISTORY OF ELEVATED PSA: ICD-10-CM

## 2024-04-03 DIAGNOSIS — Z87.448 HISTORY OF HEMATURIA: ICD-10-CM

## 2024-04-03 DIAGNOSIS — R97.20 ELEVATED PSA: ICD-10-CM

## 2024-04-03 LAB
SL AMB  POCT GLUCOSE, UA: NORMAL
SL AMB LEUKOCYTE ESTERASE,UA: NORMAL
SL AMB POCT BILIRUBIN,UA: NORMAL
SL AMB POCT BLOOD,UA: NORMAL
SL AMB POCT CLARITY,UA: CLEAR
SL AMB POCT COLOR,UA: YELLOW
SL AMB POCT KETONES,UA: NORMAL
SL AMB POCT NITRITE,UA: NORMAL
SL AMB POCT PH,UA: 7
SL AMB POCT SPECIFIC GRAVITY,UA: 1.01
SL AMB POCT URINE PROTEIN: NORMAL
SL AMB POCT UROBILINOGEN: 0.2

## 2024-04-03 PROCEDURE — 99214 OFFICE O/P EST MOD 30 MIN: CPT | Performed by: UROLOGY

## 2024-04-03 PROCEDURE — 81003 URINALYSIS AUTO W/O SCOPE: CPT | Performed by: UROLOGY

## 2024-04-04 LAB
ANION GAP SERPL CALCULATED.3IONS-SCNC: 10 MMOL/L (ref 3–11)
BUN SERPL-MCNC: 15 MG/DL (ref 7–28)
CALCIUM SERPL-MCNC: 9.4 MG/DL (ref 8.5–10.1)
CHLORIDE SERPL-SCNC: 102 MMOL/L (ref 100–109)
CO2 SERPL-SCNC: 28 MMOL/L (ref 21–31)
CREAT SERPL-MCNC: 1.04 MG/DL (ref 0.53–1.3)
CYTOLOGY CMNT CVX/VAG CYTO-IMP: NORMAL
GFR/BSA.PRED SERPLBLD CYS-BASED-ARV: 74 ML/MIN/{1.73_M2}
GLUCOSE SERPL-MCNC: 96 MG/DL (ref 65–99)
POTASSIUM SERPL-SCNC: 4.3 MMOL/L (ref 3.5–5.2)
SODIUM SERPL-SCNC: 140 MMOL/L (ref 135–145)

## 2024-04-10 ENCOUNTER — HOSPITAL ENCOUNTER (OUTPATIENT)
Dept: MRI IMAGING | Facility: HOSPITAL | Age: 74
Discharge: HOME | End: 2024-04-10
Attending: UROLOGY
Payer: MEDICARE

## 2024-04-10 DIAGNOSIS — R97.20 ELEVATED PSA: ICD-10-CM

## 2024-04-10 PROCEDURE — 72197 MRI PELVIS W/O & W/DYE: CPT

## 2024-04-10 PROCEDURE — 76377 3D RENDER W/INTRP POSTPROCES: CPT

## 2024-04-10 PROCEDURE — A9585 GADOBUTROL INJECTION: HCPCS | Performed by: UROLOGY

## 2024-04-10 RX ORDER — GADOBUTROL 604.72 MG/ML
10 INJECTION INTRAVENOUS
Status: COMPLETED | OUTPATIENT
Start: 2024-04-10 | End: 2024-04-10

## 2024-04-10 RX ADMIN — GADOBUTROL 10 ML: 604.72 INJECTION INTRAVENOUS at 08:45

## 2024-04-21 PROBLEM — N40.1 BENIGN PROSTATIC HYPERPLASIA WITH LOWER URINARY TRACT SYMPTOMS: Status: ACTIVE | Noted: 2024-04-21

## 2024-04-21 NOTE — PROGRESS NOTES
UROLOGY PROGRESS NOTE         NAME: Edgard Green  AGE: 73 y.o. SEX: male  : 1950   MRN: [id number]    DATE: 2024  TIME: 10:07 AM    Assessment and Plan   Procedures     Impression:   1. Elevated PSA    2. Family history of bladder cancer    3. History of elevated PSA    4. Benign prostatic hyperplasia with lower urinary tract symptoms, symptom details unspecified         Plan: Our plan is to follow-up patient in 1 year with a PSA 1 week prior to appointment.  Since he is asymptomatic from a BPH standpoint he would not like to do any medical therapy at this time or any consideration of a minimally invasive surgical option.      Chief Complaint   No chief complaint on file.    History of Present Illness     HPI: Edgard Green is a 73 y.o. year old male who presents with follow-up to discuss MP MRI done on 4/10/2024.  Patient had 106 g prostate and a PI-RADS score of 2.    Patient was last seen by me on 4/3/2024 history of elevated PSA of 5.49 on 3/25/2024.  Patient was cystoscopy last year that showed enlarged prostate and trabeculated bladder otherwise doing well.  At the last office visit the patient was not having any irritable or obstructive voiding complaints.    Discussed the MRI results with the patient he was pleased with the PI-RADS score 2 we both agreed observation not a prostate biopsy.  I discussed with the patient likely a PSA is up due to the large prostate.    We discussed asymptomatic BPH and the option including finasteride, Flomax, or observation.  He elects for observation since he is asymptomatic.          The following portions of the patient's history were reviewed and updated as appropriate: allergies, current medications, past family history, past medical history, past social history, past surgical history and problem list.  Past Medical History:   Diagnosis Date    Anxiety     Clotting disorder (HCC)     GERD (gastroesophageal reflux disease)     Night sweats     Urinary  tract infection      Past Surgical History:   Procedure Laterality Date    CARPAL TUNNEL RELEASE Right     JOINT REPLACEMENT      VASCULAR SURGERY Bilateral     11 prodedures for venous insufficiency     shoulder  Review of Systems     Const: Denies chills, fever and weight loss.  CV: Denies chest pain.  Resp: Denies SOB.  GI: Denies abdominal pain, nausea and vomiting.  : Denies symptoms other than stated above.  Musculo: Denies back pain.    Objective   There were no vitals taken for this visit.    Physical Exam  Const: Appears healthy and well developed. No signs of acute distress present.  Resp: Respirations are regular and unlabored.   CV: Rate is regular. Rhythm is regular.  Abdomen: Abdomen is soft, nontender, and nondistended. Kidneys are not palpable.  : dnp  Psych: Patient's attitude is cooperative. Mood is normal. Affect is normal.    Current Medications     Current Outpatient Medications:     acetaminophen (TYLENOL) 500 mg tablet, Take 500 mg by mouth every 6 (six) hours, Disp: , Rfl:     ALPRAZolam (XANAX) 0.5 mg tablet, Take 0.5 mg by mouth 2 (two) times a day, Disp: , Rfl:     Bioflavonoid Products (EVANS C PO), Take by mouth, Disp: , Rfl:     Cholecalciferol 25 MCG (1000 UT) tablet, Take by mouth, Disp: , Rfl:     cyanocobalamin (VITAMIN B-12) 1000 MCG tablet, Take 1,000 mcg by mouth, Disp: , Rfl:     esomeprazole (NexIUM) 40 MG capsule, Take 40 mg by mouth, Disp: , Rfl:     furosemide (LASIX) 20 mg tablet, Take 20 mg by mouth, Disp: , Rfl:     Klor-Con M20 20 MEQ tablet, Take 20 mEq by mouth daily, Disp: , Rfl:     losartan (COZAAR) 100 MG tablet, , Disp: , Rfl:     Multiple Vitamins-Minerals (PRESERVISION AREDS 2 PO), Take by mouth (Patient not taking: Reported on 4/5/2023), Disp: , Rfl:     Nexletol 180 MG TABS, Take 1 tablet by mouth daily, Disp: , Rfl:     rivaroxaban (XARELTO) 10 mg tablet, Take 20 mg by mouth, Disp: , Rfl:         Eric Matos MD

## 2024-04-30 ENCOUNTER — OFFICE VISIT (OUTPATIENT)
Dept: UROLOGY | Facility: CLINIC | Age: 74
End: 2024-04-30
Payer: MEDICARE

## 2024-04-30 VITALS
HEART RATE: 64 BPM | DIASTOLIC BLOOD PRESSURE: 76 MMHG | SYSTOLIC BLOOD PRESSURE: 142 MMHG | WEIGHT: 231 LBS | OXYGEN SATURATION: 96 % | BODY MASS INDEX: 34.21 KG/M2 | HEIGHT: 69 IN | TEMPERATURE: 98.8 F

## 2024-04-30 DIAGNOSIS — Z87.898 HISTORY OF ELEVATED PSA: ICD-10-CM

## 2024-04-30 DIAGNOSIS — R35.1 NOCTURIA: ICD-10-CM

## 2024-04-30 DIAGNOSIS — R97.20 ELEVATED PSA: Primary | ICD-10-CM

## 2024-04-30 DIAGNOSIS — Z80.52 FAMILY HISTORY OF BLADDER CANCER: ICD-10-CM

## 2024-04-30 DIAGNOSIS — N40.1 BENIGN PROSTATIC HYPERPLASIA WITH LOWER URINARY TRACT SYMPTOMS, SYMPTOM DETAILS UNSPECIFIED: ICD-10-CM

## 2024-04-30 PROCEDURE — 99213 OFFICE O/P EST LOW 20 MIN: CPT | Performed by: UROLOGY

## 2024-06-25 ENCOUNTER — APPOINTMENT (EMERGENCY)
Dept: CT IMAGING | Facility: HOSPITAL | Age: 74
End: 2024-06-25
Payer: MEDICARE

## 2024-06-25 ENCOUNTER — HOSPITAL ENCOUNTER (EMERGENCY)
Facility: HOSPITAL | Age: 74
Discharge: HOME | End: 2024-06-25
Attending: EMERGENCY MEDICINE
Payer: MEDICARE

## 2024-06-25 ENCOUNTER — APPOINTMENT (EMERGENCY)
Dept: RADIOLOGY | Facility: HOSPITAL | Age: 74
End: 2024-06-25
Payer: MEDICARE

## 2024-06-25 VITALS
RESPIRATION RATE: 21 BRPM | TEMPERATURE: 97.7 F | WEIGHT: 233.69 LBS | BODY MASS INDEX: 34.51 KG/M2 | DIASTOLIC BLOOD PRESSURE: 76 MMHG | OXYGEN SATURATION: 96 % | HEART RATE: 57 BPM | SYSTOLIC BLOOD PRESSURE: 167 MMHG

## 2024-06-25 DIAGNOSIS — R42 LIGHTHEADEDNESS: Primary | ICD-10-CM

## 2024-06-25 DIAGNOSIS — R07.9 CHEST PAIN: ICD-10-CM

## 2024-06-25 DIAGNOSIS — I10 HTN (HYPERTENSION): ICD-10-CM

## 2024-06-25 LAB
ALBUMIN SERPL BCG-MCNC: 4.2 G/DL (ref 3.5–5)
ALP SERPL-CCNC: 50 U/L (ref 34–104)
ALT SERPL W P-5'-P-CCNC: 19 U/L (ref 7–52)
ANION GAP SERPL CALCULATED.3IONS-SCNC: 6 MMOL/L (ref 4–13)
AST SERPL W P-5'-P-CCNC: 21 U/L (ref 13–39)
ATRIAL RATE: 71 BPM
BASOPHILS # BLD AUTO: 0.03 THOUSANDS/ÂΜL (ref 0–0.1)
BASOPHILS NFR BLD AUTO: 1 % (ref 0–1)
BILIRUB SERPL-MCNC: 0.46 MG/DL (ref 0.2–1)
BILIRUB UR QL STRIP: NEGATIVE
BUN SERPL-MCNC: 14 MG/DL (ref 5–25)
CALCIUM SERPL-MCNC: 9 MG/DL (ref 8.4–10.2)
CARDIAC TROPONIN I PNL SERPL HS: 8 NG/L
CHLORIDE SERPL-SCNC: 100 MMOL/L (ref 96–108)
CLARITY UR: CLEAR
CO2 SERPL-SCNC: 28 MMOL/L (ref 21–32)
COLOR UR: YELLOW
CREAT SERPL-MCNC: 1.03 MG/DL (ref 0.6–1.3)
EOSINOPHIL # BLD AUTO: 0.08 THOUSAND/ÂΜL (ref 0–0.61)
EOSINOPHIL NFR BLD AUTO: 1 % (ref 0–6)
ERYTHROCYTE [DISTWIDTH] IN BLOOD BY AUTOMATED COUNT: 12.8 % (ref 11.6–15.1)
FLUAV RNA RESP QL NAA+PROBE: NEGATIVE
FLUBV RNA RESP QL NAA+PROBE: NEGATIVE
GFR SERPL CREATININE-BSD FRML MDRD: 70 ML/MIN/1.73SQ M
GLUCOSE SERPL-MCNC: 119 MG/DL (ref 65–140)
GLUCOSE UR STRIP-MCNC: NEGATIVE MG/DL
HCT VFR BLD AUTO: 48 % (ref 36.5–49.3)
HGB BLD-MCNC: 16.4 G/DL (ref 12–17)
HGB UR QL STRIP.AUTO: NEGATIVE
IMM GRANULOCYTES # BLD AUTO: 0.02 THOUSAND/UL (ref 0–0.2)
IMM GRANULOCYTES NFR BLD AUTO: 0 % (ref 0–2)
KETONES UR STRIP-MCNC: NEGATIVE MG/DL
LEUKOCYTE ESTERASE UR QL STRIP: NEGATIVE
LYMPHOCYTES # BLD AUTO: 1.45 THOUSANDS/ÂΜL (ref 0.6–4.47)
LYMPHOCYTES NFR BLD AUTO: 23 % (ref 14–44)
MCH RBC QN AUTO: 31.2 PG (ref 26.8–34.3)
MCHC RBC AUTO-ENTMCNC: 34.2 G/DL (ref 31.4–37.4)
MCV RBC AUTO: 91 FL (ref 82–98)
MONOCYTES # BLD AUTO: 0.61 THOUSAND/ÂΜL (ref 0.17–1.22)
MONOCYTES NFR BLD AUTO: 10 % (ref 4–12)
NEUTROPHILS # BLD AUTO: 4.03 THOUSANDS/ÂΜL (ref 1.85–7.62)
NEUTS SEG NFR BLD AUTO: 65 % (ref 43–75)
NITRITE UR QL STRIP: NEGATIVE
NRBC BLD AUTO-RTO: 0 /100 WBCS
P AXIS: 34 DEGREES
PH UR STRIP.AUTO: 6 [PH]
PLATELET # BLD AUTO: 225 THOUSANDS/UL (ref 149–390)
PMV BLD AUTO: 8.9 FL (ref 8.9–12.7)
POTASSIUM SERPL-SCNC: 4.4 MMOL/L (ref 3.5–5.3)
PR INTERVAL: 178 MS
PROT SERPL-MCNC: 6.9 G/DL (ref 6.4–8.4)
PROT UR STRIP-MCNC: NEGATIVE MG/DL
QRS AXIS: -7 DEGREES
QRSD INTERVAL: 96 MS
QT INTERVAL: 400 MS
QTC INTERVAL: 434 MS
RBC # BLD AUTO: 5.25 MILLION/UL (ref 3.88–5.62)
RSV RNA RESP QL NAA+PROBE: NEGATIVE
SARS-COV-2 RNA RESP QL NAA+PROBE: NEGATIVE
SODIUM SERPL-SCNC: 134 MMOL/L (ref 135–147)
SP GR UR STRIP.AUTO: 1.01 (ref 1–1.03)
T WAVE AXIS: 11 DEGREES
UROBILINOGEN UR QL STRIP.AUTO: 0.2 E.U./DL
VENTRICULAR RATE: 71 BPM
WBC # BLD AUTO: 6.22 THOUSAND/UL (ref 4.31–10.16)

## 2024-06-25 PROCEDURE — 70450 CT HEAD/BRAIN W/O DYE: CPT

## 2024-06-25 PROCEDURE — 80053 COMPREHEN METABOLIC PANEL: CPT | Performed by: EMERGENCY MEDICINE

## 2024-06-25 PROCEDURE — 93005 ELECTROCARDIOGRAM TRACING: CPT

## 2024-06-25 PROCEDURE — 85025 COMPLETE CBC W/AUTO DIFF WBC: CPT | Performed by: EMERGENCY MEDICINE

## 2024-06-25 PROCEDURE — 36415 COLL VENOUS BLD VENIPUNCTURE: CPT | Performed by: EMERGENCY MEDICINE

## 2024-06-25 PROCEDURE — 81003 URINALYSIS AUTO W/O SCOPE: CPT | Performed by: EMERGENCY MEDICINE

## 2024-06-25 PROCEDURE — 99285 EMERGENCY DEPT VISIT HI MDM: CPT | Performed by: EMERGENCY MEDICINE

## 2024-06-25 PROCEDURE — 0241U HB NFCT DS VIR RESP RNA 4 TRGT: CPT | Performed by: EMERGENCY MEDICINE

## 2024-06-25 PROCEDURE — 84484 ASSAY OF TROPONIN QUANT: CPT | Performed by: EMERGENCY MEDICINE

## 2024-06-25 PROCEDURE — 71045 X-RAY EXAM CHEST 1 VIEW: CPT

## 2024-06-25 RX ORDER — LABETALOL HYDROCHLORIDE 5 MG/ML
10 INJECTION, SOLUTION INTRAVENOUS ONCE
Status: COMPLETED | OUTPATIENT
Start: 2024-06-25 | End: 2024-06-25

## 2024-06-25 RX ADMIN — LABETALOL HYDROCHLORIDE 10 MG: 5 INJECTION, SOLUTION INTRAVENOUS at 07:37

## 2024-06-25 NOTE — ED PROVIDER NOTES
History  Chief Complaint   Patient presents with    Hypertension     Patient presents w/ c/o of elevated BP since Friday with lightheadedness. Patient also expressing concern of a temperature 93 degrees at home.       History provided by:  Patient and medical records  Dizziness  Quality:  Lightheadedness  Severity:  Mild  Onset quality:  Gradual  Duration:  4 days  Timing:  Intermittent  Progression:  Waxing and waning  Chronicity:  New  Context comment:  Patient reports having lightheadedness over the last 4 days.  Patient has been checking his blood pressure and is noted to be high, 197/90 this morning.  Patient also had a low temperature of 93, but admits this could be erroneous, denies infectious s/s.  Relieved by:  Nothing  Worsened by:  Nothing  Ineffective treatments:  None tried  Associated symptoms: chest pain and headaches    Associated symptoms: no blood in stool, no diarrhea, no hearing loss, no nausea, no palpitations, no shortness of breath, no syncope, no tinnitus, no vision changes, no vomiting and no weakness    Risk factors comment:  The patient states he has been compliant with his medication however at times he will hold off on his Lasix if he feels he is going to get dehydrated      Prior to Admission Medications   Prescriptions Last Dose Informant Patient Reported? Taking?   ALPRAZolam (XANAX) 0.5 mg tablet  Self Yes No   Sig: Take 0.5 mg by mouth 2 (two) times a day   Bioflavonoid Products (EVANS C PO)  Self Yes No   Sig: Take by mouth   Cholecalciferol 25 MCG (1000 UT) tablet  Self Yes No   Sig: Take by mouth   Klor-Con M20 20 MEQ tablet  Self Yes No   Sig: Take 20 mEq by mouth daily   Multiple Vitamins-Minerals (PRESERVISION AREDS 2 PO)  Self Yes No   Sig: Take by mouth   Nexletol 180 MG TABS  Self Yes No   Sig: Take 1 tablet by mouth daily   acetaminophen (TYLENOL) 500 mg tablet  Self Yes No   Sig: Take 500 mg by mouth every 6 (six) hours   cyanocobalamin (VITAMIN B-12) 1000 MCG tablet   Self Yes No   Sig: Take 1,000 mcg by mouth   esomeprazole (NexIUM) 40 MG capsule  Self Yes No   Sig: Take 40 mg by mouth   furosemide (LASIX) 20 mg tablet Past Week Self Yes Yes   Sig: Take 20 mg by mouth   losartan (COZAAR) 100 MG tablet 6/25/2024 Self Yes Yes   Sig: Take 100 mg by mouth daily   rivaroxaban (XARELTO) 10 mg tablet 6/24/2024 Self Yes Yes   Sig: Take 20 mg by mouth      Facility-Administered Medications: None       Past Medical History:   Diagnosis Date    Anxiety     Clotting disorder (HCC)     GERD (gastroesophageal reflux disease)     Night sweats     Urinary tract infection        Past Surgical History:   Procedure Laterality Date    CARPAL TUNNEL RELEASE Right     JOINT REPLACEMENT      VASCULAR SURGERY Bilateral     11 prodedures for venous insufficiency       Family History   Problem Relation Age of Onset    Alzheimer's disease Father     Breast cancer Mother     Cancer Maternal Uncle      I have reviewed and agree with the history as documented.    E-Cigarette/Vaping    E-Cigarette Use Never User      E-Cigarette/Vaping Substances    Nicotine No     THC No     CBD No     Flavoring No     Other No     Unknown No      Social History     Tobacco Use    Smoking status: Never    Smokeless tobacco: Never   Vaping Use    Vaping status: Never Used   Substance Use Topics    Alcohol use: Not Currently    Drug use: Never       Review of Systems   Constitutional:  Negative for appetite change, chills, fatigue and fever.   HENT:  Negative for ear pain, hearing loss, rhinorrhea, sore throat, tinnitus and trouble swallowing.    Eyes:  Negative for pain, discharge and visual disturbance.   Respiratory:  Negative for cough, chest tightness and shortness of breath.    Cardiovascular:  Positive for chest pain. Negative for palpitations and syncope.   Gastrointestinal:  Negative for abdominal pain, blood in stool, diarrhea, nausea and vomiting.   Endocrine: Negative for polydipsia, polyphagia and polyuria.    Genitourinary:  Negative for difficulty urinating, dysuria, hematuria and testicular pain.   Musculoskeletal:  Negative for arthralgias and back pain.   Skin:  Negative for color change and rash.   Allergic/Immunologic: Negative for immunocompromised state.   Neurological:  Positive for light-headedness and headaches. Negative for dizziness, seizures, syncope and weakness.   Hematological:  Negative for adenopathy.   Psychiatric/Behavioral:  Negative for confusion and dysphoric mood.    All other systems reviewed and are negative.      Physical Exam  Physical Exam  Vitals and nursing note reviewed.   Constitutional:       General: He is not in acute distress.     Appearance: Normal appearance. He is not ill-appearing, toxic-appearing or diaphoretic.   HENT:      Head: Normocephalic and atraumatic.      Nose: Nose normal. No congestion or rhinorrhea.      Mouth/Throat:      Mouth: Mucous membranes are moist.      Pharynx: Oropharynx is clear. No oropharyngeal exudate or posterior oropharyngeal erythema.   Eyes:      General:         Right eye: No discharge.         Left eye: No discharge.      Extraocular Movements: Extraocular movements intact.      Conjunctiva/sclera: Conjunctivae normal.      Pupils: Pupils are equal, round, and reactive to light.   Cardiovascular:      Rate and Rhythm: Normal rate and regular rhythm.      Pulses: Normal pulses.      Heart sounds: Normal heart sounds. No murmur heard.     No gallop.   Pulmonary:      Effort: Pulmonary effort is normal. No respiratory distress.      Breath sounds: Normal breath sounds. No stridor. No wheezing, rhonchi or rales.   Chest:      Chest wall: No tenderness.   Abdominal:      General: Bowel sounds are normal. There is no distension.      Palpations: Abdomen is soft. There is no mass.      Tenderness: There is no abdominal tenderness. There is no right CVA tenderness, left CVA tenderness, guarding or rebound.      Hernia: No hernia is present.    Musculoskeletal:         General: Normal range of motion.      Cervical back: Normal range of motion and neck supple.   Skin:     General: Skin is warm and dry.      Capillary Refill: Capillary refill takes less than 2 seconds.   Neurological:      General: No focal deficit present.      Mental Status: He is alert and oriented to person, place, and time.      Cranial Nerves: No cranial nerve deficit.      Sensory: No sensory deficit.      Motor: No weakness.      Coordination: Coordination normal.      Gait: Gait normal.      Deep Tendon Reflexes: Reflexes normal.   Psychiatric:         Mood and Affect: Mood normal.         Behavior: Behavior normal.         Thought Content: Thought content normal.         Judgment: Judgment normal.         Vital Signs  ED Triage Vitals [06/25/24 0726]   Temperature Pulse Respirations Blood Pressure SpO2   97.7 °F (36.5 °C) 69 17 (!) 221/109 97 %      Temp Source Heart Rate Source Patient Position - Orthostatic VS BP Location FiO2 (%)   Temporal Monitor Sitting Right arm --      Pain Score       No Pain           Vitals:    06/25/24 0815 06/25/24 0901 06/25/24 0946 06/25/24 1001   BP: (!) 174/89 (!) 176/93 168/79 167/76   Pulse: (!) 54 58 59 57   Patient Position - Orthostatic VS:             Visual Acuity      ED Medications  Medications   labetalol (NORMODYNE) injection 10 mg (10 mg Intravenous Given 6/25/24 0737)       Diagnostic Studies  Results Reviewed       Procedure Component Value Units Date/Time    Comprehensive metabolic panel [[id number]]  (Abnormal) Collected: 06/25/24 0853    Lab Status: Final result Specimen: Blood from Arm, Left Updated: 06/25/24 0919     Sodium 134 mmol/L      Potassium 4.4 mmol/L      Chloride 100 mmol/L      CO2 28 mmol/L      ANION GAP 6 mmol/L      BUN 14 mg/dL      Creatinine 1.03 mg/dL      Glucose 119 mg/dL      Calcium 9.0 mg/dL      AST 21 U/L      ALT 19 U/L      Alkaline Phosphatase 50 U/L      Total Protein 6.9 g/dL      Albumin 4.2  g/dL      Total Bilirubin 0.46 mg/dL      eGFR 70 ml/min/1.73sq m     Narrative:      National Kidney Disease Foundation guidelines for Chronic Kidney Disease (CKD):     Stage 1 with normal or high GFR (GFR > 90 mL/min/1.73 square meters)    Stage 2 Mild CKD (GFR = 60-89 mL/min/1.73 square meters)    Stage 3A Moderate CKD (GFR = 45-59 mL/min/1.73 square meters)    Stage 3B Moderate CKD (GFR = 30-44 mL/min/1.73 square meters)    Stage 4 Severe CKD (GFR = 15-29 mL/min/1.73 square meters)    Stage 5 End Stage CKD (GFR <15 mL/min/1.73 square meters)  Note: GFR calculation is accurate only with a steady state creatinine    FLU/RSV/COVID - if FLU/RSV clinically relevant [[id number]]  (Normal) Collected: 06/25/24 0733    Lab Status: Final result Specimen: Nares from Nose Updated: 06/25/24 0828     SARS-CoV-2 Negative     INFLUENZA A PCR Negative     INFLUENZA B PCR Negative     RSV PCR Negative    Narrative:      FOR PEDIATRIC PATIENTS - copy/paste COVID Guidelines URL to browser: [email or web address]    SARS-CoV-2 assay is a Nucleic Acid Amplification assay intended for the  qualitative detection of nucleic acid from SARS-CoV-2 in nasopharyngeal  swabs. Results are for the presumptive identification of SARS-CoV-2 RNA.    Positive results are indicative of infection with SARS-CoV-2, the virus  causing COVID-19, but do not rule out bacterial infection or co-infection  with other viruses. Laboratories within the [place] and its  territories are required to report all positive results to the appropriate  public health authorities. Negative results do not preclude SARS-CoV-2  infection and should not be used as the sole basis for treatment or other  patient management decisions. Negative results must be combined with  clinical observations, patient history, and epidemiological information.  This test has not been FDA cleared or approved.    This test has been  authorized by FDA under an Emergency Use Authorization  (EUA). This test is only authorized for the duration of time the  declaration that circumstances exist justifying the authorization of the  emergency use of an in vitro diagnostic tests for detection of SARS-CoV-2  virus and/or diagnosis of COVID-19 infection under section 564(b)(1) of  the Act, 21 U.S.C. 360bbb-3(b)(1), unless the authorization is terminated  or revoked sooner. The test has been validated but independent review by FDA  and CLIA is pending.    Test performed using [employer]: This RT-PCR assay targets N2,  a region unique to SARS-CoV-2. A conserved region in the E-gene was chosen  for pan-Sarbecovirus detection which includes SARS-CoV-2.    According to CMS-2020-01-R, this platform meets the definition of high-throughput technology.    HS Troponin 0hr (reflex protocol) [[id number]]  (Normal) Collected: 06/25/24 0733    Lab Status: Final result Specimen: Blood from Arm, Left Updated: 06/25/24 0814     hs TnI 0hr 8 ng/L     UA w Reflex to Microscopic w Reflex to Culture [[id number]] Collected: 06/25/24 0732    Lab Status: Final result Specimen: Urine, Clean Catch Updated: 06/25/24 0741     Color, UA Yellow     Clarity, UA Clear     Specific Gravity, UA 1.010     pH, UA 6.0     Leukocytes, UA Negative     Nitrite, UA Negative     Protein, UA Negative mg/dl      Glucose, UA Negative mg/dl      Ketones, UA Negative mg/dl      Urobilinogen, UA 0.2 E.U./dl      Bilirubin, UA Negative     Occult Blood, UA Negative    CBC and differential [[id number]] Collected: 06/25/24 0733    Lab Status: Final result Specimen: Blood from Arm, Left Updated: 06/25/24 0740     WBC 6.22 Thousand/uL      RBC 5.25 Million/uL      Hemoglobin 16.4 g/dL      Hematocrit 48.0 %      MCV 91 fL      MCH 31.2 pg      MCHC 34.2 g/dL      RDW 12.8 %      MPV 8.9 fL      Platelets 225 Thousands/uL      nRBC 0 /100 WBCs      Segmented % 65 %      Immature Grans % 0 %       Lymphocytes % 23 %      Monocytes % 10 %      Eosinophils Relative 1 %      Basophils Relative 1 %      Absolute Neutrophils 4.03 Thousands/µL      Absolute Immature Grans 0.02 Thousand/uL      Absolute Lymphocytes 1.45 Thousands/µL      Absolute Monocytes 0.61 Thousand/µL      Eosinophils Absolute 0.08 Thousand/µL      Basophils Absolute 0.03 Thousands/µL                    CT head without contrast   Final Result by Erik Hough MD (06/25 0758)      No acute intracranial abnormality.                  Workstation performed: [id number]         XR chest 1 view portable   Final Result by Hill Méndez DO (06/25 1223)      No evidence of acute cardiopulmonary disease.      Enlarged cardiomediastinal silhouette.      Resident: Erik Lewis I, the attending radiologist, have reviewed the images and agree with the final report above.      Workstation performed: [id number]                    Procedures  Procedures         ED Course             HEART Risk Score      Flowsheet Row Most Recent Value   Heart Score Risk Calculator    History 0 Filed at: 06/25/2024 0956   ECG 0 Filed at: 06/25/2024 0956   Age 2 Filed at: 06/25/2024 0956   Risk Factors 0 Filed at: 06/25/2024 0956   Troponin 0 Filed at: 06/25/2024 0956   HEART Score 2 Filed at: 06/25/2024 0956                          SBIRT 22yo+      Flowsheet Row Most Recent Value   Initial Alcohol Screen: US AUDIT-C     1. How often do you have a drink containing alcohol? 0 Filed at: 06/25/2024 0728   2. How many drinks containing alcohol do you have on a typical day you are drinking?  0 Filed at: 06/25/2024 0728   3a. Male UNDER 65: How often do you have five or more drinks on one occasion? 0 Filed at: 06/25/2024 0728   3b. FEMALE Any Age, or MALE 65+: How often do you have 4 or more drinks on one occassion? 0 Filed at: 06/25/2024 0728   Audit-C Score 0 Filed at: 06/25/2024 0728   FABI: How many times in the past year have you...    Used an illegal  drug or used a prescription medication for non-medical reasons? Never Filed at: 06/25/2024 0728                      Medical Decision Making  0718: Patient appears well, vital signs reviewed.  The patient has a chief complaint of lightheadedness for the past 4 days, he believes is secondary to his hypertension that was noted to be 197/90 this morning.  Patient also reports having exposure in the heat over the weekend and feels this could cause him to be dehydrated.  The patient is on Xarelto.  He does complain of some mild headache at times, not currently present, he also complains of some mild chest discomfort over the weekend, not currently present.  Placed on monitor.  Plan to complete basic labs including cardiac enzymes.  Check chest x-ray, EKG and CT head.  Normal neurological exam.  NIH SS 0.  Patient is noted to have severe hypertension in triage.  Plan to give a dose of labetalol, reevaluate.    0900: CT, chest x-ray and labs reviewed.  Patient has remained stable throughout ED course.    Close follow-up with PCP.  Stable for discharge.    Amount and/or Complexity of Data Reviewed  Labs: ordered.  Radiology: ordered and independent interpretation performed.     Details: CT head--no intracranial hemorrhage  Chest x-ray--no acute pathology  ECG/medicine tests: ordered and independent interpretation performed.     Details: Normal sinus rhythm 71 bpm, single PAC, minimal voltage criteria for LVH, poor R wave progression, possible Q waves inferiorly, unchanged when compared to EKG from 2017.    Risk  Prescription drug management.             Disposition  Final diagnoses:   Lightheadedness   HTN (hypertension)   Chest pain     Time reflects when diagnosis was documented in both MDM as applicable and the Disposition within this note       Time User Action Codes Description Comment    6/25/2024  9:56 AM Hill Chicas Add [R42] Lightheadedness     6/25/2024  9:56 AM Hill Chicas Add [I10] HTN (hypertension)      6/25/2024  9:56 AM Hill Chicas Add [R07.9] Chest pain           ED Disposition       ED Disposition   Discharge    Condition   Stable    Date/Time   Tue Jun 25, 2024 0956    Comment   Edgard Norma discharge to home/self care.                   Follow-up Information       Follow up With Specialties Details Why Contact Info    Otis Griffin DO Family Medicine Schedule an appointment as soon as possible for a visit   [address] [facility] [address]  [address]  [place] [address]  [phone]              Discharge Medication List as of 6/25/2024  9:57 AM        CONTINUE these medications which have NOT CHANGED    Details   furosemide (LASIX) 20 mg tablet Take 20 mg by mouth, Historical Med      losartan (COZAAR) 100 MG tablet Take 100 mg by mouth daily, Historical Med      rivaroxaban (XARELTO) 10 mg tablet Take 20 mg by mouth, Starting Fri 7/7/2017, Historical Med      acetaminophen (TYLENOL) 500 mg tablet Take 500 mg by mouth every 6 (six) hours, Historical Med      ALPRAZolam (XANAX) 0.5 mg tablet Take 0.5 mg by mouth 2 (two) times a day, Historical Med      Bioflavonoid Products (EVANS C PO) Take by mouth, Historical Med      Cholecalciferol 25 MCG (1000 UT) tablet Take by mouth, Historical Med      cyanocobalamin (VITAMIN B-12) 1000 MCG tablet Take 1,000 mcg by mouth, Historical Med      esomeprazole (NexIUM) 40 MG capsule Take 40 mg by mouth, Historical Med      Klor-Con M20 20 MEQ tablet Take 20 mEq by mouth daily, Starting Mon 12/26/2022, Historical Med      Multiple Vitamins-Minerals (PRESERVISION AREDS 2 PO) Take by mouth, Historical Med      Nexletol 180 MG TABS Take 1 tablet by mouth daily, Starting Sun 2/19/2023, Historical Med             No discharge procedures on file.    PDMP Review       None            ED Provider  Electronically Signed by             Hill Chicas MD  06/25/24 7308

## 2024-08-07 ENCOUNTER — DOCTOR'S OFFICE (OUTPATIENT)
Dept: URBAN - NONMETROPOLITAN AREA CLINIC 1 | Facility: CLINIC | Age: 74
Setting detail: OPHTHALMOLOGY
End: 2024-08-07
Payer: MEDICARE

## 2024-08-07 DIAGNOSIS — H25.13: ICD-10-CM

## 2024-08-07 PROCEDURE — CATARACT K CATARACT KIT: Performed by: OPHTHALMOLOGY

## 2024-12-19 ENCOUNTER — HOSPITAL ENCOUNTER (OUTPATIENT)
Dept: RADIOLOGY | Facility: CLINIC | Age: 74
End: 2024-12-19
Payer: MEDICARE

## 2024-12-19 VITALS — WEIGHT: 226.8 LBS | BODY MASS INDEX: 33.59 KG/M2 | HEIGHT: 69 IN

## 2024-12-19 DIAGNOSIS — E55.9 VITAMIN D DEFICIENCY, UNSPECIFIED: ICD-10-CM

## 2024-12-19 DIAGNOSIS — E34.9 ENDOCRINE DISORDER, UNSPECIFIED: ICD-10-CM

## 2024-12-19 DIAGNOSIS — Z79.899 OTHER LONG TERM (CURRENT) DRUG THERAPY: ICD-10-CM

## 2024-12-19 PROCEDURE — 77080 DXA BONE DENSITY AXIAL: CPT

## 2024-12-27 ENCOUNTER — HOSPITAL ENCOUNTER (EMERGENCY)
Facility: HOSPITAL | Age: 74
Discharge: HOME | End: 2024-12-27
Attending: STUDENT IN AN ORGANIZED HEALTH CARE EDUCATION/TRAINING PROGRAM
Payer: MEDICARE

## 2024-12-27 ENCOUNTER — OFFICE VISIT (OUTPATIENT)
Dept: URGENT CARE | Facility: CLINIC | Age: 74
End: 2024-12-27
Payer: MEDICARE

## 2024-12-27 VITALS
RESPIRATION RATE: 18 BRPM | DIASTOLIC BLOOD PRESSURE: 90 MMHG | SYSTOLIC BLOOD PRESSURE: 180 MMHG | HEIGHT: 69 IN | WEIGHT: 230 LBS | OXYGEN SATURATION: 97 % | TEMPERATURE: 98.7 F | BODY MASS INDEX: 34.07 KG/M2 | HEART RATE: 74 BPM

## 2024-12-27 VITALS
SYSTOLIC BLOOD PRESSURE: 160 MMHG | TEMPERATURE: 98.8 F | RESPIRATION RATE: 15 BRPM | OXYGEN SATURATION: 100 % | BODY MASS INDEX: 33.62 KG/M2 | HEART RATE: 61 BPM | WEIGHT: 227 LBS | HEIGHT: 69 IN | DIASTOLIC BLOOD PRESSURE: 84 MMHG

## 2024-12-27 DIAGNOSIS — I16.0 HYPERTENSIVE URGENCY: Primary | ICD-10-CM

## 2024-12-27 DIAGNOSIS — I10 HYPERTENSION, UNSPECIFIED TYPE: Primary | ICD-10-CM

## 2024-12-27 LAB
ANION GAP SERPL CALCULATED.3IONS-SCNC: 8 MMOL/L (ref 4–13)
ATRIAL RATE: 68 BPM
BASOPHILS # BLD AUTO: 0.04 THOUSANDS/ÂΜL (ref 0–0.1)
BASOPHILS NFR BLD AUTO: 1 % (ref 0–1)
BUN SERPL-MCNC: 12 MG/DL (ref 5–25)
CALCIUM SERPL-MCNC: 9.5 MG/DL (ref 8.4–10.2)
CARDIAC TROPONIN I PNL SERPL HS: 8 NG/L (ref 8–18)
CHLORIDE SERPL-SCNC: 102 MMOL/L (ref 96–108)
CO2 SERPL-SCNC: 29 MMOL/L (ref 21–32)
CREAT SERPL-MCNC: 0.96 MG/DL (ref 0.6–1.3)
EOSINOPHIL # BLD AUTO: 0.07 THOUSAND/ÂΜL (ref 0–0.61)
EOSINOPHIL NFR BLD AUTO: 1 % (ref 0–6)
ERYTHROCYTE [DISTWIDTH] IN BLOOD BY AUTOMATED COUNT: 13.2 % (ref 11.6–15.1)
GFR SERPL CREATININE-BSD FRML MDRD: 76 ML/MIN/1.73SQ M
GLUCOSE SERPL-MCNC: 102 MG/DL (ref 65–140)
HCT VFR BLD AUTO: 45.2 % (ref 36.5–49.3)
HGB BLD-MCNC: 15.6 G/DL (ref 12–17)
IMM GRANULOCYTES # BLD AUTO: 0.02 THOUSAND/UL (ref 0–0.2)
IMM GRANULOCYTES NFR BLD AUTO: 0 % (ref 0–2)
LYMPHOCYTES # BLD AUTO: 1.09 THOUSANDS/ÂΜL (ref 0.6–4.47)
LYMPHOCYTES NFR BLD AUTO: 16 % (ref 14–44)
MCH RBC QN AUTO: 30.8 PG (ref 26.8–34.3)
MCHC RBC AUTO-ENTMCNC: 34.5 G/DL (ref 31.4–37.4)
MCV RBC AUTO: 89 FL (ref 82–98)
MONOCYTES # BLD AUTO: 0.52 THOUSAND/ÂΜL (ref 0.17–1.22)
MONOCYTES NFR BLD AUTO: 8 % (ref 4–12)
NEUTROPHILS # BLD AUTO: 5.2 THOUSANDS/ÂΜL (ref 1.85–7.62)
NEUTS SEG NFR BLD AUTO: 74 % (ref 43–75)
NRBC BLD AUTO-RTO: 0 /100 WBCS
P AXIS: 20 DEGREES
PLATELET # BLD AUTO: 230 THOUSANDS/UL (ref 149–390)
PMV BLD AUTO: 8.8 FL (ref 8.9–12.7)
POTASSIUM SERPL-SCNC: 4 MMOL/L (ref 3.5–5.3)
PR INTERVAL: 162 MS
QRS AXIS: -23 DEGREES
QRSD INTERVAL: 88 MS
QT INTERVAL: 388 MS
QTC INTERVAL: 412 MS
RBC # BLD AUTO: 5.07 MILLION/UL (ref 3.88–5.62)
SODIUM SERPL-SCNC: 139 MMOL/L (ref 135–147)
T WAVE AXIS: 2 DEGREES
VENTRICULAR RATE: 68 BPM
WBC # BLD AUTO: 6.94 THOUSAND/UL (ref 4.31–10.16)

## 2024-12-27 PROCEDURE — 99284 EMERGENCY DEPT VISIT MOD MDM: CPT | Performed by: STUDENT IN AN ORGANIZED HEALTH CARE EDUCATION/TRAINING PROGRAM

## 2024-12-27 PROCEDURE — 80048 BASIC METABOLIC PNL TOTAL CA: CPT | Performed by: STUDENT IN AN ORGANIZED HEALTH CARE EDUCATION/TRAINING PROGRAM

## 2024-12-27 PROCEDURE — 93005 ELECTROCARDIOGRAM TRACING: CPT

## 2024-12-27 PROCEDURE — 99213 OFFICE O/P EST LOW 20 MIN: CPT

## 2024-12-27 PROCEDURE — G0463 HOSPITAL OUTPT CLINIC VISIT: HCPCS

## 2024-12-27 PROCEDURE — 84484 ASSAY OF TROPONIN QUANT: CPT | Performed by: STUDENT IN AN ORGANIZED HEALTH CARE EDUCATION/TRAINING PROGRAM

## 2024-12-27 PROCEDURE — 85025 COMPLETE CBC W/AUTO DIFF WBC: CPT | Performed by: STUDENT IN AN ORGANIZED HEALTH CARE EDUCATION/TRAINING PROGRAM

## 2024-12-27 PROCEDURE — 36415 COLL VENOUS BLD VENIPUNCTURE: CPT | Performed by: STUDENT IN AN ORGANIZED HEALTH CARE EDUCATION/TRAINING PROGRAM

## 2024-12-27 PROCEDURE — 99283 EMERGENCY DEPT VISIT LOW MDM: CPT

## 2024-12-27 NOTE — DISCHARGE INSTRUCTIONS
The labs that were obtained did not show any significant abnormalities.     An outpatient referral to Cardiology was provided. Expect a phone call within the next few days to set up an appointment.     Continue all prescribed medications. Return to the ED for any concerning signs or symptoms.

## 2024-12-27 NOTE — ED PROVIDER NOTES
Time reflects when diagnosis was documented in both MDM as applicable and the Disposition within this note       Time User Action Codes Description Comment    12/27/2024  2:28 PM Cesar Echavarria Add [I16.0] Hypertensive urgency           ED Disposition       ED Disposition   Discharge    Condition   Stable    Date/Time   Fri Dec 27, 2024  2:27 PM    Comment   Edgardанна Green discharge to home/self care.                   Assessment & Plan       Medical Decision Making  This patient presents with hypertension.   Diagnostic considerations include ACS, hypertensive emergency, hypertensive urgency, KAYLA.    Vital signs reviewed.  Patient presents with elevated BP readings. Denies chest pain, shortness of breath, changes in vision, headache. Laboratory w/u largely unremarkable. BP fluctuating throughout the course of treatment but without signs of hypertensive emergency. OP Cardiology referral provided.         Problems Addressed:  Hypertensive urgency: acute illness or injury    Amount and/or Complexity of Data Reviewed  Labs: ordered.      Medications - No data to display    ED Risk Strat Scores          SBIRT 22yo+      Flowsheet Row Most Recent Value   Initial Alcohol Screen: US AUDIT-C     1. How often do you have a drink containing alcohol? 0 Filed at: 12/27/2024 1202   2. How many drinks containing alcohol do you have on a typical day you are drinking?  0 Filed at: 12/27/2024 1202   3b. FEMALE Any Age, or MALE 65+: How often do you have 4 or more drinks on one occassion? 0 Filed at: 12/27/2024 1202   Audit-C Score 0 Filed at: 12/27/2024 1202   FABI: How many times in the past year have you...    Used an illegal drug or used a prescription medication for non-medical reasons? Never Filed at: 12/27/2024 1202          History of Present Illness       Chief Complaint   Patient presents with    Hypertension     Patient sent from  for HTN. States BP has been high and he is taking losartan and amlodipine. BP at  was  180/90. States BP prior to going to urgent care was over 190's systolic.         Past Medical History:   Diagnosis Date    Anxiety     Clotting disorder (HCC)     GERD (gastroesophageal reflux disease)     Night sweats     Urinary tract infection       Past Surgical History:   Procedure Laterality Date    CARPAL TUNNEL RELEASE Right     FL GUIDED NEEDLE PLAC BX/ASP/INJ  3/13/2018    JOINT REPLACEMENT      VASCULAR SURGERY Bilateral     11 prodedures for venous insufficiency      Family History   Problem Relation Age of Onset    Alzheimer's disease Father     Breast cancer Mother     Cancer Maternal Uncle       Social History     Tobacco Use    Smoking status: Never    Smokeless tobacco: Never   Vaping Use    Vaping status: Never Used   Substance Use Topics    Alcohol use: Not Currently    Drug use: Never      E-Cigarette/Vaping    E-Cigarette Use Never User       E-Cigarette/Vaping Substances    Nicotine No     THC No     CBD No     Flavoring No     Other No     Unknown No       I have reviewed and agree with the history as documented.     Patient presents with hypertension. Hx of HTN. On amlodipine 5 mg QHS, losartan 100 mg daily. He states that he has been having elevated BPs for approximately one week. For the last week, the patient has been having fluctuating blood pressures. The patient denies chest pain/shortness of breath/changes in vision/headache. Was evaluated at Urgent Care who recommended ED evaluation.       History provided by:  Patient and medical records  Hypertension  Severity:  Moderate  Onset quality:  Gradual  Duration:  1 week  Timing:  Intermittent  Progression:  Worsening  Chronicity:  New  Ineffective treatments:  Ca channel blockers and angiotensin blockers  Associated symptoms: no abdominal pain, no blurred vision, no chest pain, no confusion, no dizziness, no fatigue, no fever, no headaches, no nausea, no palpitations, no shortness of breath, no syncope, no tinnitus, not vomiting and no  weakness      Review of Systems   Constitutional:  Negative for activity change, appetite change, chills, fatigue and fever.   HENT:  Negative for tinnitus.    Eyes:  Negative for blurred vision.   Respiratory:  Negative for chest tightness and shortness of breath.    Cardiovascular:  Negative for chest pain, palpitations and syncope.   Gastrointestinal:  Negative for abdominal pain, nausea and vomiting.   Musculoskeletal:  Negative for back pain.   Neurological:  Negative for dizziness, syncope, facial asymmetry, speech difficulty, weakness, light-headedness, numbness and headaches.   Psychiatric/Behavioral:  Negative for confusion and decreased concentration.    All other systems reviewed and are negative.    Objective       ED Triage Vitals [12/27/24 1201]   Temperature Pulse Blood Pressure Respirations SpO2 Patient Position - Orthostatic VS   98.8 °F (37.1 °C) 72 (S) (!) 195/100 18 97 % Sitting      Temp Source Heart Rate Source BP Location FiO2 (%) Pain Score    Temporal Monitor Left arm -- No Pain      Vitals      Date and Time Temp Pulse SpO2 Resp BP Pain Score FACES Pain Rating User   12/27/24 1430 -- 61 100 % 15 160/84 -- -- PK   12/27/24 1402 -- 62 99 % 17 181/88 -- -- PK   12/27/24 1227 -- 66 98 % 17 142/84 -- -- PK   12/27/24 1201 98.8 °F (37.1 °C) 72 97 % 18  195/100 No Pain -- SL            Physical Exam  Vitals and nursing note reviewed.   Constitutional:       General: He is not in acute distress.     Appearance: He is not ill-appearing or toxic-appearing.   HENT:      Head: Normocephalic and atraumatic.      Right Ear: External ear normal.      Left Ear: External ear normal.      Nose: No congestion or rhinorrhea.   Eyes:      General: No scleral icterus.        Right eye: No discharge.         Left eye: No discharge.      Extraocular Movements: Extraocular movements intact.      Conjunctiva/sclera: Conjunctivae normal.   Cardiovascular:      Rate and Rhythm: Normal rate and regular rhythm.       Pulses: Normal pulses.   Pulmonary:      Effort: Pulmonary effort is normal. No respiratory distress.      Breath sounds: Normal breath sounds. No stridor. No wheezing, rhonchi or rales.   Chest:      Chest wall: No tenderness.   Abdominal:      General: Bowel sounds are normal. There is no distension.      Palpations: Abdomen is soft.      Tenderness: There is no abdominal tenderness. There is no guarding or rebound.   Musculoskeletal:      Right lower leg: Edema present.      Left lower leg: Edema present.   Skin:     General: Skin is warm and dry.      Coloration: Skin is not jaundiced or pale.   Neurological:      General: No focal deficit present.      Mental Status: He is alert and oriented to person, place, and time.   Psychiatric:         Mood and Affect: Mood normal.         Behavior: Behavior normal.         Results Reviewed       Procedure Component Value Units Date/Time    High Sensitivity Troponin I Random [[id number]]  (Normal) Collected: 12/27/24 1336    Lab Status: Final result Specimen: Blood from Arm, Right Updated: 12/27/24 1415     HS TnI random 8 ng/L     Basic metabolic panel [[id number]] Collected: 12/27/24 1336    Lab Status: Final result Specimen: Blood from Arm, Right Updated: 12/27/24 1404     Sodium 139 mmol/L      Potassium 4.0 mmol/L      Chloride 102 mmol/L      CO2 29 mmol/L      ANION GAP 8 mmol/L      BUN 12 mg/dL      Creatinine 0.96 mg/dL      Glucose 102 mg/dL      Calcium 9.5 mg/dL      eGFR 76 ml/min/1.73sq m     Narrative:      National Kidney Disease Foundation guidelines for Chronic Kidney Disease (CKD):     Stage 1 with normal or high GFR (GFR > 90 mL/min/1.73 square meters)    Stage 2 Mild CKD (GFR = 60-89 mL/min/1.73 square meters)    Stage 3A Moderate CKD (GFR = 45-59 mL/min/1.73 square meters)    Stage 3B Moderate CKD (GFR = 30-44 mL/min/1.73 square meters)    Stage 4 Severe CKD (GFR = 15-29 mL/min/1.73 square meters)    Stage 5 End Stage CKD (GFR <15 mL/min/1.73 square  meters)  Note: GFR calculation is accurate only with a steady state creatinine    CBC and differential [[id number]]  (Abnormal) Collected: 12/27/24 1336    Lab Status: Final result Specimen: Blood from Arm, Right Updated: 12/27/24 1347     WBC 6.94 Thousand/uL      RBC 5.07 Million/uL      Hemoglobin 15.6 g/dL      Hematocrit 45.2 %      MCV 89 fL      MCH 30.8 pg      MCHC 34.5 g/dL      RDW 13.2 %      MPV 8.8 fL      Platelets 230 Thousands/uL      nRBC 0 /100 WBCs      Segmented % 74 %      Immature Grans % 0 %      Lymphocytes % 16 %      Monocytes % 8 %      Eosinophils Relative 1 %      Basophils Relative 1 %      Absolute Neutrophils 5.20 Thousands/µL      Absolute Immature Grans 0.02 Thousand/uL      Absolute Lymphocytes 1.09 Thousands/µL      Absolute Monocytes 0.52 Thousand/µL      Eosinophils Absolute 0.07 Thousand/µL      Basophils Absolute 0.04 Thousands/µL             No orders to display       Procedures    ED Medication and Procedure Management   Prior to Admission Medications   Prescriptions Last Dose Informant Patient Reported? Taking?   ALPRAZolam (XANAX) 0.5 mg tablet  Self Yes No   Sig: Take 0.5 mg by mouth 2 (two) times a day   Bioflavonoid Products (EVANS C PO)  Self Yes No   Sig: Take by mouth   Cholecalciferol 25 MCG (1000 UT) tablet  Self Yes No   Sig: Take by mouth   Klor-Con M20 20 MEQ tablet  Self Yes No   Sig: Take 20 mEq by mouth daily   Multiple Vitamins-Minerals (PRESERVISION AREDS 2 PO)  Self Yes No   Sig: Take by mouth   Nexletol 180 MG TABS  Self Yes No   Sig: Take 1 tablet by mouth daily   acetaminophen (TYLENOL) 500 mg tablet  Self Yes No   Sig: Take 500 mg by mouth every 6 (six) hours   cyanocobalamin (VITAMIN B-12) 1000 MCG tablet  Self Yes No   Sig: Take 1,000 mcg by mouth   esomeprazole (NexIUM) 40 MG capsule  Self Yes No   Sig: Take 40 mg by mouth   furosemide (LASIX) 20 mg tablet  Self Yes No   Sig: Take 20 mg by mouth   losartan (COZAAR) 100 MG tablet  Self Yes No   Sig:  Take 100 mg by mouth daily   rivaroxaban (XARELTO) 10 mg tablet  Self Yes No   Sig: Take 20 mg by mouth      Facility-Administered Medications: None     Discharge Medication List as of 12/27/2024  2:30 PM        CONTINUE these medications which have NOT CHANGED    Details   acetaminophen (TYLENOL) 500 mg tablet Take 500 mg by mouth every 6 (six) hours, Historical Med      ALPRAZolam (XANAX) 0.5 mg tablet Take 0.5 mg by mouth 2 (two) times a day, Historical Med      Bioflavonoid Products (EVANS C PO) Take by mouth, Historical Med      Cholecalciferol 25 MCG (1000 UT) tablet Take by mouth, Historical Med      cyanocobalamin (VITAMIN B-12) 1000 MCG tablet Take 1,000 mcg by mouth, Historical Med      esomeprazole (NexIUM) 40 MG capsule Take 40 mg by mouth, Historical Med      furosemide (LASIX) 20 mg tablet Take 20 mg by mouth, Historical Med      Klor-Con M20 20 MEQ tablet Take 20 mEq by mouth daily, Starting Mon 12/26/2022, Historical Med      losartan (COZAAR) 100 MG tablet Take 100 mg by mouth daily, Historical Med      Multiple Vitamins-Minerals (PRESERVISION AREDS 2 PO) Take by mouth, Historical Med      Nexletol 180 MG TABS Take 1 tablet by mouth daily, Starting Sun 2/19/2023, Historical Med      rivaroxaban (XARELTO) 10 mg tablet Take 20 mg by mouth, Starting Fri 7/7/2017, Historical Med             ED SEPSIS DOCUMENTATION   Time reflects when diagnosis was documented in both MDM as applicable and the Disposition within this note       Time User Action Codes Description Comment    12/27/2024  2:28 PM Cesar Echavarria Add [I16.0] Hypertensive urgency                  Cesar Echavarria,   12/27/24 1714

## 2024-12-27 NOTE — PATIENT INSTRUCTIONS
Proceed to ED for further evaluation.     Follow up with PCP in 3-5 days.  Proceed to  ER if symptoms worsen.    If tests have been performed at [facility], our office will contact you with results if changes need to be made to the care plan discussed with you at the visit.  You can review your full results on [facility] MyChart.

## 2024-12-27 NOTE — PROGRESS NOTES
[facility]        NAME: Edgard Green is a 74 y.o. male  : 1950    MRN: [id number]  DATE: 2024  TIME: 11:36 AM    Assessment and Plan   Hypertension, unspecified type [I10]  1. Hypertension, unspecified type  Transfer to other facility        Given elevated BP readings both at home and in clinic after taking all prescribed medications this morning, recommend further evaluation for HTN in the ED setting. Patient in agreement with plan.     Patient Instructions   Proceed to ED for further evaluation.     Follow up with PCP in 3-5 days.  Proceed to  ER if symptoms worsen.    If tests have been performed at [facility], our office will contact you with results if changes need to be made to the care plan discussed with you at the visit.  [facility]    Chief Complaint     Chief Complaint   Patient presents with    Hypertension     States has HTN & is currently on BP medications. Took his medications this AM however had an at home reading of 200/190.         History of Present Illness       Patient is a 74 year old male with PMH including HTN. He presents today for evaluation of elevated BP readings at home.   He states reading at home was 258/123 this morning after taking his BP medications.   The patient reports that his BP is typically in the 120/70s.  Denies experiencing SOB, CP, or visual disturbances. He does reports experiencing intermittent HA and fatigue.    Hypertension  This is a new problem. The current episode started today. The problem is unchanged. Associated symptoms include headaches and malaise/fatigue. Pertinent negatives include no blurred vision, chest pain, neck pain, palpitations or shortness of breath. There are no associated agents to hypertension. Treatments tried: Currently taking furosemide & losartan. The current treatment provides no improvement. There are no compliance problems.        Review of Systems   Review of  Systems   Constitutional:  Positive for fatigue and malaise/fatigue. Negative for activity change, appetite change, chills and fever.   HENT:  Negative for congestion, ear pain, rhinorrhea, sinus pressure, sinus pain, sore throat, trouble swallowing and voice change.    Eyes:  Negative for blurred vision, pain, discharge and visual disturbance.   Respiratory:  Negative for cough, chest tightness and shortness of breath.    Cardiovascular:  Negative for chest pain and palpitations.   Gastrointestinal:  Negative for abdominal pain, constipation, diarrhea, nausea and vomiting.   Genitourinary:  Negative for decreased urine volume.   Musculoskeletal:  Negative for arthralgias, gait problem, joint swelling, myalgias, neck pain and neck stiffness.   Skin:  Negative for color change, pallor and rash.   Neurological:  Positive for headaches. Negative for dizziness, syncope, facial asymmetry, speech difficulty, weakness, light-headedness and numbness.   Psychiatric/Behavioral:  Negative for confusion and decreased concentration.    All other systems reviewed and are negative.        Current Medications       Current Outpatient Medications:     acetaminophen (TYLENOL) 500 mg tablet, Take 500 mg by mouth every 6 (six) hours, Disp: , Rfl:     ALPRAZolam (XANAX) 0.5 mg tablet, Take 0.5 mg by mouth 2 (two) times a day, Disp: , Rfl:     Bioflavonoid Products (EVANS C PO), Take by mouth, Disp: , Rfl:     Cholecalciferol 25 MCG (1000 UT) tablet, Take by mouth, Disp: , Rfl:     cyanocobalamin (VITAMIN B-12) 1000 MCG tablet, Take 1,000 mcg by mouth, Disp: , Rfl:     esomeprazole (NexIUM) 40 MG capsule, Take 40 mg by mouth, Disp: , Rfl:     furosemide (LASIX) 20 mg tablet, Take 20 mg by mouth, Disp: , Rfl:     Klor-Con M20 20 MEQ tablet, Take 20 mEq by mouth daily, Disp: , Rfl:     losartan (COZAAR) 100 MG tablet, Take 100 mg by mouth daily, Disp: , Rfl:     Multiple Vitamins-Minerals (PRESERVISION AREDS 2 PO), Take by mouth, Disp: ,  "Rfl:     Nexletol 180 MG TABS, Take 1 tablet by mouth daily, Disp: , Rfl:     rivaroxaban (XARELTO) 10 mg tablet, Take 20 mg by mouth, Disp: , Rfl:     Current Allergies     Allergies as of 12/27/2024 - Reviewed 12/27/2024   Allergen Reaction Noted    Erythromycin GI Intolerance 07/24/2018    Penicillins Hives 07/24/2018    Statins Myalgia 11/28/2016            The following portions of the patient's history were reviewed and updated as appropriate: allergies, current medications, past family history, past medical history, past social history, past surgical history and problem list.     Past Medical History:   Diagnosis Date    Anxiety     Clotting disorder (HCC)     GERD (gastroesophageal reflux disease)     Night sweats     Urinary tract infection        Past Surgical History:   Procedure Laterality Date    CARPAL TUNNEL RELEASE Right     FL GUIDED NEEDLE PLAC BX/ASP/INJ  3/13/2018    JOINT REPLACEMENT      VASCULAR SURGERY Bilateral     11 prodedures for venous insufficiency       Family History   Problem Relation Age of Onset    Alzheimer's disease Father     Breast cancer Mother     Cancer Maternal Uncle          Medications have been verified.        Objective   BP (!) 180/90 (BP Location: Left arm, Patient Position: Sitting, Cuff Size: Standard)   Pulse 74   Temp 98.7 °F (37.1 °C)   Resp 18   Ht 5' 9\" (1.753 m)   Wt 104 kg (230 lb)   SpO2 97%   BMI 33.97 kg/m²   No LMP for male patient.       Physical Exam     Physical Exam  Vitals and nursing note reviewed.   Constitutional:       General: He is not in acute distress.     Appearance: Normal appearance. He is not ill-appearing or toxic-appearing.   HENT:      Head: Normocephalic and atraumatic.      Right Ear: External ear normal.      Left Ear: External ear normal.      Nose: Nose normal. No congestion or rhinorrhea.      Mouth/Throat:      Mouth: Mucous membranes are moist.      Pharynx: Oropharynx is clear. No oropharyngeal exudate or posterior " oropharyngeal erythema.   Eyes:      General:         Right eye: No discharge.         Left eye: No discharge.      Extraocular Movements: Extraocular movements intact.      Conjunctiva/sclera: Conjunctivae normal.      Pupils: Pupils are equal, round, and reactive to light.   Cardiovascular:      Rate and Rhythm: Normal rate and regular rhythm.      Pulses: Normal pulses.      Heart sounds: Normal heart sounds.   Pulmonary:      Effort: Pulmonary effort is normal. No respiratory distress.      Breath sounds: Normal breath sounds. No stridor. No wheezing, rhonchi or rales.   Chest:      Chest wall: No tenderness.   Abdominal:      General: Abdomen is flat.      Palpations: Abdomen is soft.   Musculoskeletal:         General: Normal range of motion.      Cervical back: Normal range of motion and neck supple.   Skin:     General: Skin is warm.      Capillary Refill: Capillary refill takes less than 2 seconds.      Coloration: Skin is not jaundiced or pale.      Findings: No bruising, erythema, lesion or rash.   Neurological:      General: No focal deficit present.      Mental Status: He is alert. Mental status is at baseline.   Psychiatric:         Mood and Affect: Mood normal.         Behavior: Behavior normal.         Thought Content: Thought content normal.         Judgment: Judgment normal.

## 2025-05-21 ENCOUNTER — TELEPHONE (OUTPATIENT)
Age: 75
End: 2025-05-21

## 2025-05-21 NOTE — TELEPHONE ENCOUNTER
Pt under care of: Carlo    Office Location: [place]    Insurance   Current Insurance? Medicare   Insurance E-verified? Yes    History  Last Seen (include Follow Up recommendations of last visit- see Office Visit - Instructions): 4/30/24 - 1 year with a PSA 1 week prior to appointment.     Pt calling to schedule his 1 year follow up visit.     Appointment Details   Date: 7/8/25  Time: 10:15am  Location: [place]  Provider: Carlo    Does the appointment need further review?   No

## 2025-06-30 ENCOUNTER — TELEPHONE (OUTPATIENT)
Dept: UROLOGY | Facility: CLINIC | Age: 75
End: 2025-06-30

## 2025-06-30 DIAGNOSIS — N40.1 BENIGN PROSTATIC HYPERPLASIA WITH LOWER URINARY TRACT SYMPTOMS, SYMPTOM DETAILS UNSPECIFIED: Primary | ICD-10-CM

## 2025-06-30 NOTE — TELEPHONE ENCOUNTER
Telephone call with patient as reminder to have PSA drawn before appointment.    Faxing orders to [facility]

## 2025-06-30 NOTE — PROGRESS NOTES
UROLOGY PROGRESS NOTE         NAME: Edgard Green  AGE: 75 y.o. SEX: male  : 1950   MRN: [id number]    DATE: 2025  TIME: 8:09 PM    Assessment and Plan   Procedures     Impression:   1. Benign prostatic hyperplasia with lower urinary tract symptoms, symptom details unspecified  2. History of elevated PSA  3. History of hematuria  4. Family history of bladder cancer       Plan: Urologically patient doing well.  Not on any medicines for enlarged prostate.  Pleased with his PSA results follow-up in 1 year repeat exam including AYANA PSA      Chief Complaint   No chief complaint on file.    History of Present Illness     HPI: Edgard Green is a 75 y.o. year old male who presents with follow-up from office visit on 2024.  Patient with a history of elevated PSA, family history of bladder cancer, and BPH.  It was noted he was asymptomatic from a BPH standpoint and we were going to do observation.    Regarding the elevated PSA patient had an MP MRI on 2024 that showed 106 g prostate and a PI-RADS score of 2.  His PSA 2024 was 5.49.    Cystoscopy from  showed enlarged prostate and a trabeculated bladder otherwise normal.  At the last office visit patient was not having any irritative or obstructive voiding complaints.  Patient's PSA on 2025 was 3.6  Currently patient doing well pleased with his PSA results.  Occasionally has decreased and flow otherwise no voiding complaints.          The following portions of the patient's history were reviewed and updated as appropriate: allergies, current medications, past family history, past medical history, past social history, past surgical history and problem list.  Past Medical History[1]  Past Surgical History[2]  shoulder  Review of Systems     Const: Denies chills, fever and weight loss.  CV: Denies chest pain.  Resp: Denies SOB.  GI: Denies abdominal pain, nausea and vomiting.  : Denies symptoms other than stated above.  Musculo:  Denies back pain.    Objective   There were no vitals taken for this visit.    Physical Exam  Const: Appears healthy and well developed. No signs of acute distress present.  Resp: Respirations are regular and unlabored.   CV: Rate is regular. Rhythm is regular.  Abdomen: Abdomen is soft, nontender, and nondistended. Kidneys are not palpable.  : External genitalia exam normal prostate enlarged smooth  Psych: Patient's attitude is cooperative. Mood is normal. Affect is normal.    Current Medications   Current Medications[3]        Eric Matos MD             [1]   Past Medical History:  Diagnosis Date    Anxiety     Clotting disorder (HCC)     GERD (gastroesophageal reflux disease)     Night sweats     Urinary tract infection    [2]   Past Surgical History:  Procedure Laterality Date    CARPAL TUNNEL RELEASE Right     FL GUIDED NEEDLE PLAC BX/ASP/INJ  3/13/2018    JOINT REPLACEMENT      VASCULAR SURGERY Bilateral     11 prodedures for venous insufficiency   [3]   Current Outpatient Medications:     acetaminophen (TYLENOL) 500 mg tablet, Take 500 mg by mouth every 6 (six) hours, Disp: , Rfl:     ALPRAZolam (XANAX) 0.5 mg tablet, Take 0.5 mg by mouth 2 (two) times a day, Disp: , Rfl:     Bioflavonoid Products (EVANS C PO), Take by mouth, Disp: , Rfl:     Cholecalciferol 25 MCG (1000 UT) tablet, Take by mouth, Disp: , Rfl:     cyanocobalamin (VITAMIN B-12) 1000 MCG tablet, Take 1,000 mcg by mouth, Disp: , Rfl:     esomeprazole (NexIUM) 40 MG capsule, Take 40 mg by mouth, Disp: , Rfl:     furosemide (LASIX) 20 mg tablet, Take 20 mg by mouth, Disp: , Rfl:     Klor-Con M20 20 MEQ tablet, Take 20 mEq by mouth daily, Disp: , Rfl:     losartan (COZAAR) 100 MG tablet, Take 100 mg by mouth daily, Disp: , Rfl:     Multiple Vitamins-Minerals (PRESERVISION AREDS 2 PO), Take by mouth, Disp: , Rfl:     Nexletol 180 MG TABS, Take 1 tablet by mouth daily, Disp: , Rfl:     rivaroxaban (XARELTO) 10 mg tablet, Take 20 mg by mouth,  Disp: , Rfl:

## 2025-07-01 LAB — SL AMB PSA, TOTAL: 3.65 NG/ML

## 2025-07-01 RX ORDER — CLONIDINE HYDROCHLORIDE 0.1 MG/1
0.1 TABLET ORAL DAILY PRN
COMMUNITY
Start: 2025-01-27

## 2025-07-01 RX ORDER — EVOLOCUMAB 140 MG/ML
INJECTION, SOLUTION SUBCUTANEOUS
COMMUNITY

## 2025-07-01 RX ORDER — UREA 200 MG/G
CREAM TOPICAL
COMMUNITY

## 2025-07-01 RX ORDER — AMLODIPINE BESYLATE 5 MG/1
5 TABLET ORAL DAILY
COMMUNITY
Start: 2024-06-29

## 2025-07-08 ENCOUNTER — OFFICE VISIT (OUTPATIENT)
Dept: UROLOGY | Facility: CLINIC | Age: 75
End: 2025-07-08
Payer: MEDICARE

## 2025-07-08 VITALS
SYSTOLIC BLOOD PRESSURE: 152 MMHG | WEIGHT: 230 LBS | HEIGHT: 69 IN | TEMPERATURE: 97.7 F | OXYGEN SATURATION: 98 % | BODY MASS INDEX: 34.07 KG/M2 | DIASTOLIC BLOOD PRESSURE: 88 MMHG | HEART RATE: 65 BPM

## 2025-07-08 DIAGNOSIS — Z87.898 HISTORY OF ELEVATED PSA: ICD-10-CM

## 2025-07-08 DIAGNOSIS — R35.1 NOCTURIA: ICD-10-CM

## 2025-07-08 DIAGNOSIS — Z80.52 FAMILY HISTORY OF BLADDER CANCER: ICD-10-CM

## 2025-07-08 DIAGNOSIS — Z87.448 HISTORY OF HEMATURIA: ICD-10-CM

## 2025-07-08 DIAGNOSIS — N40.1 BENIGN PROSTATIC HYPERPLASIA WITH LOWER URINARY TRACT SYMPTOMS, SYMPTOM DETAILS UNSPECIFIED: Primary | ICD-10-CM

## 2025-07-08 LAB
SL AMB  POCT GLUCOSE, UA: NORMAL
SL AMB LEUKOCYTE ESTERASE,UA: NORMAL
SL AMB POCT BILIRUBIN,UA: NORMAL
SL AMB POCT BLOOD,UA: NORMAL
SL AMB POCT CLARITY,UA: CLEAR
SL AMB POCT COLOR,UA: YELLOW
SL AMB POCT KETONES,UA: NORMAL
SL AMB POCT NITRITE,UA: NORMAL
SL AMB POCT PH,UA: 6
SL AMB POCT SPECIFIC GRAVITY,UA: <=1.005
SL AMB POCT URINE PROTEIN: NORMAL
SL AMB POCT UROBILINOGEN: 0.2

## 2025-07-08 PROCEDURE — 99214 OFFICE O/P EST MOD 30 MIN: CPT | Performed by: UROLOGY

## 2025-07-08 PROCEDURE — 81003 URINALYSIS AUTO W/O SCOPE: CPT | Performed by: UROLOGY
